# Patient Record
Sex: FEMALE | Race: OTHER | Employment: UNEMPLOYED | URBAN - METROPOLITAN AREA
[De-identification: names, ages, dates, MRNs, and addresses within clinical notes are randomized per-mention and may not be internally consistent; named-entity substitution may affect disease eponyms.]

---

## 2017-05-19 ENCOUNTER — OFFICE VISIT (OUTPATIENT)
Dept: FAMILY MEDICINE CLINIC | Facility: CLINIC | Age: 22
End: 2017-05-19

## 2017-05-19 ENCOUNTER — PATIENT OUTREACH (OUTPATIENT)
Dept: FAMILY MEDICINE CLINIC | Facility: CLINIC | Age: 22
End: 2017-05-19

## 2017-05-19 VITALS
SYSTOLIC BLOOD PRESSURE: 110 MMHG | WEIGHT: 228 LBS | TEMPERATURE: 98 F | BODY MASS INDEX: 42 KG/M2 | DIASTOLIC BLOOD PRESSURE: 70 MMHG | OXYGEN SATURATION: 98 % | HEART RATE: 94 BPM | RESPIRATION RATE: 18 BRPM

## 2017-05-19 DIAGNOSIS — R51.9 HEADACHE, UNSPECIFIED HEADACHE TYPE: ICD-10-CM

## 2017-05-19 DIAGNOSIS — J06.9 VIRAL URI WITH COUGH: Primary | ICD-10-CM

## 2017-05-19 PROCEDURE — 99213 OFFICE O/P EST LOW 20 MIN: CPT | Performed by: FAMILY MEDICINE

## 2017-05-19 RX ORDER — SUMATRIPTAN 100 MG/1
1 TABLET, FILM COATED ORAL DAILY PRN
Refills: 0 | COMMUNITY
Start: 2017-05-15 | End: 2018-07-06 | Stop reason: ALTCHOICE

## 2017-05-19 NOTE — PROGRESS NOTES
[facility] Family Medicine Office Note  Chief Complaint:   Patient presents with:  Nasal Congestion: Sinus pressure, productive cough.       HPI:   This is a 22year old female coming in for  HPI  URI symptoms  States she has had left ear pain x1 Negative for pain and visual disturbance. Respiratory: Negative for cough and shortness of breath. Cardiovascular: Negative for chest pain and palpitations. Gastrointestinal: Negative for nausea, vomiting and abdominal pain.    Genitourinary: Lázaro Mota Patient/Caregiver Education: Patient/Caregiver Education: There are no barriers to learning. Medical education done. Outcome: Patient verbalizes understanding.  Patient is notified to call with any questions, complications, allergies, or worsening

## 2017-05-19 NOTE — PATIENT INSTRUCTIONS
· Viruses do not improve with antibiotics - but it takes time for the infection to resolve. DayQuil or Mucinex to help with congestion. · Medications do not get rid of the infection, but help to alleviate symptoms.    · Honey is a natural remedy to decr

## 2017-06-16 ENCOUNTER — PATIENT OUTREACH (OUTPATIENT)
Dept: FAMILY MEDICINE CLINIC | Facility: CLINIC | Age: 22
End: 2017-06-16

## 2018-01-19 ENCOUNTER — OFFICE VISIT (OUTPATIENT)
Dept: FAMILY MEDICINE CLINIC | Facility: CLINIC | Age: 23
End: 2018-01-19

## 2018-01-19 VITALS
HEART RATE: 84 BPM | BODY MASS INDEX: 43.61 KG/M2 | RESPIRATION RATE: 16 BRPM | WEIGHT: 237 LBS | DIASTOLIC BLOOD PRESSURE: 76 MMHG | HEIGHT: 62 IN | TEMPERATURE: 98 F | SYSTOLIC BLOOD PRESSURE: 118 MMHG | OXYGEN SATURATION: 98 %

## 2018-01-19 DIAGNOSIS — H10.021 OTHER MUCOPURULENT CONJUNCTIVITIS OF RIGHT EYE: Primary | ICD-10-CM

## 2018-01-19 PROCEDURE — 99213 OFFICE O/P EST LOW 20 MIN: CPT | Performed by: NURSE PRACTITIONER

## 2018-01-19 RX ORDER — POLYMYXIN B SULFATE AND TRIMETHOPRIM 1; 10000 MG/ML; [USP'U]/ML
1 SOLUTION OPHTHALMIC EVERY 6 HOURS
Qty: 1 BOTTLE | Refills: 0 | Status: SHIPPED | OUTPATIENT
Start: 2018-01-19 | End: 2018-01-26

## 2018-01-19 NOTE — PROGRESS NOTES
CHIEF COMPLAINT:   Patient presents with:  Eye Problem: crusty, goopy Right eye this morning, needs note for work       HPI:   Prema Eaton is a 25year old female who presents with chief complaint of \"pink eye\". Symptoms began  1  days ago.  Symptoms /76 (BP Location: Right arm, Patient Position: Sitting, Cuff Size: large)   Pulse 84   Temp 98.2 °F (36.8 °C) (Oral)   Resp 16   Ht 62\"   Wt 237 lb   SpO2 98%   BMI 43.35 kg/m²   GENERAL: well developed, well nourished,in no apparent distress  SKIN: The membrane that covers the white part of your eye (the conjunctiva) is inflamed. Inflammation happens when your body responds to an injury, allergic reaction, infection, or illness.  Symptoms of inflammation in the eye may include redness, irritation, itc

## 2018-01-19 NOTE — PATIENT INSTRUCTIONS
Conjunctivitis, Nonspecific    The membrane that covers the white part of your eye (the conjunctiva) is inflamed. Inflammation happens when your body responds to an injury, allergic reaction, infection, or illness.  Symptoms of inflammation in the eye m

## 2018-07-06 ENCOUNTER — OFFICE VISIT (OUTPATIENT)
Dept: FAMILY MEDICINE CLINIC | Facility: CLINIC | Age: 23
End: 2018-07-06

## 2018-07-06 VITALS
OXYGEN SATURATION: 98 % | RESPIRATION RATE: 16 BRPM | SYSTOLIC BLOOD PRESSURE: 120 MMHG | BODY MASS INDEX: 40.3 KG/M2 | HEART RATE: 77 BPM | TEMPERATURE: 99 F | HEIGHT: 62 IN | WEIGHT: 219 LBS | DIASTOLIC BLOOD PRESSURE: 78 MMHG

## 2018-07-06 DIAGNOSIS — Z13.0 SCREENING FOR ENDOCRINE, NUTRITIONAL, METABOLIC AND IMMUNITY DISORDER: Primary | ICD-10-CM

## 2018-07-06 DIAGNOSIS — E55.9 VITAMIN D DEFICIENCY: ICD-10-CM

## 2018-07-06 DIAGNOSIS — Z13.29 SCREENING FOR ENDOCRINE, NUTRITIONAL, METABOLIC AND IMMUNITY DISORDER: Primary | ICD-10-CM

## 2018-07-06 DIAGNOSIS — Z13.228 SCREENING FOR ENDOCRINE, NUTRITIONAL, METABOLIC AND IMMUNITY DISORDER: Primary | ICD-10-CM

## 2018-07-06 DIAGNOSIS — F90.0 ATTENTION DEFICIT HYPERACTIVITY DISORDER (ADHD), PREDOMINANTLY INATTENTIVE TYPE: ICD-10-CM

## 2018-07-06 DIAGNOSIS — Z13.21 SCREENING FOR ENDOCRINE, NUTRITIONAL, METABOLIC AND IMMUNITY DISORDER: Primary | ICD-10-CM

## 2018-07-06 PROCEDURE — 99213 OFFICE O/P EST LOW 20 MIN: CPT | Performed by: NURSE PRACTITIONER

## 2018-07-06 NOTE — PROGRESS NOTES
[facility]   PROGRESS NOTE  Chief Complaint:   Patient presents with:  ADD: f/u      HPI:   This is a 25year old female coming in for ADHD medication follow up     ADHD :  Patient is here for for ADHD follow up . History provided by the patient. throat.   INTEGUMENTARY:  Denies rashes, or itchy skin   CARDIOVASCULAR:  Denies chest pain,  Or palpitations   RESPIRATORY:  Denies shortness of breath, wheezing  GASTROINTESTINAL:  Denies abdominal pain, nausea, vomiting, constipation  MUSCULOSKELETAL: + Lisdexamfetamine Dimesylate (VYVANSE) 20 MG Oral Cap; Take 1 capsule (20 mg total) by mouth daily.  -     Lisdexamfetamine Dimesylate (VYVANSE) 20 MG Oral Cap; Take 1 capsule (20 mg total) by mouth daily.     Continue  Treatment as prescribed     Other orde symptoms. Patient is to call with any side effects or complications from the treatments as a result of today.      Problem List:  Patient Active Problem List:     ADD (attention deficit disorder)     Mild intermittent asthma without complication      Carlotta Acuna

## 2018-07-23 ENCOUNTER — OFFICE VISIT (OUTPATIENT)
Dept: FAMILY MEDICINE CLINIC | Facility: CLINIC | Age: 23
End: 2018-07-23
Payer: COMMERCIAL

## 2018-07-23 VITALS
WEIGHT: 223 LBS | RESPIRATION RATE: 16 BRPM | DIASTOLIC BLOOD PRESSURE: 74 MMHG | OXYGEN SATURATION: 98 % | HEIGHT: 63 IN | HEART RATE: 62 BPM | SYSTOLIC BLOOD PRESSURE: 118 MMHG | TEMPERATURE: 99 F | BODY MASS INDEX: 39.51 KG/M2

## 2018-07-23 DIAGNOSIS — J06.9 VIRAL UPPER RESPIRATORY INFECTION: ICD-10-CM

## 2018-07-23 DIAGNOSIS — J02.9 SORE THROAT: Primary | ICD-10-CM

## 2018-07-23 LAB — CONTROL LINE PRESENT WITH A CLEAR BACKGROUND (YES/NO): YES YES/NO

## 2018-07-23 PROCEDURE — 99213 OFFICE O/P EST LOW 20 MIN: CPT | Performed by: NURSE PRACTITIONER

## 2018-07-23 PROCEDURE — 87880 STREP A ASSAY W/OPTIC: CPT | Performed by: NURSE PRACTITIONER

## 2018-07-23 NOTE — PROGRESS NOTES
CHIEF COMPLAINT:   Patient presents with:  Sore Throat: 4 days. HPI:   Paxton Plummer is a 25year old female who presents to clinic with symptoms of sore throat. Patient has had for 4 days. Symptoms have been about the same since onset.   Patient repo EARS: TM's clear, non-injected, no bulging, retraction, or fluid  NOSE: nostrils patent, no exudates, nasal mucosa pink and noninflamed  THROAT: oral mucosa pink, moist. Posterior pharynx erythematous, not injected. no exudates. Tonsils 2/4.   Breath not ma · Tylenol/Ibuprofen per package directions as needed to relieve headache and fever  · Avoid antihistamines as they may impair flow of mucous  · Follow up is not indicated unless symptoms worsen after 3-5 days, new symptoms develop, or symptoms do not impro · Avoid being exposed to cigarette smoke (yours or others’).   · You may use acetaminophen or ibuprofen to control pain and fever, unless another medicine was prescribed. If you have chronic liver or kidney disease, have ever had a stomach ulcer or gastroin Sore throats happen for many reasons, such as colds, allergies, and infections caused by viruses or bacteria. In any case, your throat becomes red and sore.  Your goal for self-care is to reduce your discomfort while giving your throat a chance to heal.  Mo · A temperature over 101°F (38.3°C)  · White spots on the throat  · Great difficulty swallowing  · Trouble breathing  · A skin rash  · Recent exposure to someone else with strep bacteria  · Severe hoarseness and swollen glands in the neck or jaw   Date Las

## 2018-07-23 NOTE — PATIENT INSTRUCTIONS
Viral Upper Respiratory Illness (Adult)  You have a viral upper respiratory illness (URI), which is another term for the common cold. This illness is contagious during the first few days. It is spread through the air by coughing and sneezing.  It may also Call your healthcare provider right away if any of these occur:  · Cough with lots of colored sputum (mucus)  · Severe headache; face, neck, or ear pain  · Difficulty swallowing due to throat pain  · Fever of 100.4°F (38°C) or higher, or as directed by you · Ease pain with anesthetic sprays. Aspirin or an aspirin substitute also helps.  Remember, never give aspirin to anyone 25 or younger, or if you are already taking blood thinners.   · For sore throats caused by allergies, try antihistamines to block the al · Warm fluids such as tea and chicken soup can increase the rate of mucous flow  · Salt water gargle for sore throat  · Hard candy or lozenge for sore throat and cough  · Tylenol/Ibuprofen per package directions as needed to relieve headache and fever  · A

## 2018-08-10 ENCOUNTER — LAB ENCOUNTER (OUTPATIENT)
Dept: LAB | Age: 23
End: 2018-08-10
Attending: FAMILY MEDICINE
Payer: COMMERCIAL

## 2018-08-10 ENCOUNTER — OFFICE VISIT (OUTPATIENT)
Dept: FAMILY MEDICINE CLINIC | Facility: CLINIC | Age: 23
End: 2018-08-10
Payer: COMMERCIAL

## 2018-08-10 VITALS
HEIGHT: 62 IN | SYSTOLIC BLOOD PRESSURE: 110 MMHG | RESPIRATION RATE: 16 BRPM | OXYGEN SATURATION: 98 % | WEIGHT: 217 LBS | HEART RATE: 80 BPM | DIASTOLIC BLOOD PRESSURE: 70 MMHG | BODY MASS INDEX: 39.93 KG/M2

## 2018-08-10 DIAGNOSIS — Z12.4 CERVICAL CANCER SCREENING: ICD-10-CM

## 2018-08-10 DIAGNOSIS — Z13.228 SCREENING FOR ENDOCRINE, NUTRITIONAL, METABOLIC AND IMMUNITY DISORDER: ICD-10-CM

## 2018-08-10 DIAGNOSIS — Z13.0 SCREENING FOR ENDOCRINE, NUTRITIONAL, METABOLIC AND IMMUNITY DISORDER: ICD-10-CM

## 2018-08-10 DIAGNOSIS — F98.8 ATTENTION DEFICIT DISORDER, UNSPECIFIED HYPERACTIVITY PRESENCE: ICD-10-CM

## 2018-08-10 DIAGNOSIS — Z00.00 HEALTH MAINTENANCE EXAMINATION: Primary | ICD-10-CM

## 2018-08-10 DIAGNOSIS — Z13.21 SCREENING FOR ENDOCRINE, NUTRITIONAL, METABOLIC AND IMMUNITY DISORDER: ICD-10-CM

## 2018-08-10 DIAGNOSIS — E55.9 VITAMIN D DEFICIENCY: ICD-10-CM

## 2018-08-10 DIAGNOSIS — Z13.29 SCREENING FOR ENDOCRINE, NUTRITIONAL, METABOLIC AND IMMUNITY DISORDER: ICD-10-CM

## 2018-08-10 LAB
ALBUMIN SERPL-MCNC: 4.3 G/DL (ref 3.5–4.8)
ALBUMIN/GLOB SERPL: 1.2 {RATIO} (ref 1–2)
ALP LIVER SERPL-CCNC: 64 U/L (ref 52–144)
ALT SERPL-CCNC: 20 U/L (ref 14–54)
ANION GAP SERPL CALC-SCNC: 8 MMOL/L (ref 0–18)
AST SERPL-CCNC: 18 U/L (ref 15–41)
BASOPHILS # BLD AUTO: 0.04 X10(3) UL (ref 0–0.1)
BASOPHILS NFR BLD AUTO: 0.6 %
BILIRUB SERPL-MCNC: 0.4 MG/DL (ref 0.1–2)
BUN BLD-MCNC: 10 MG/DL (ref 8–20)
BUN/CREAT SERPL: 11.6 (ref 10–20)
CALCIUM BLD-MCNC: 9.3 MG/DL (ref 8.3–10.3)
CHLORIDE SERPL-SCNC: 106 MMOL/L (ref 101–111)
CHOLEST SMN-MCNC: 165 MG/DL (ref ?–200)
CO2 SERPL-SCNC: 26 MMOL/L (ref 22–32)
CREAT BLD-MCNC: 0.86 MG/DL (ref 0.55–1.02)
EOSINOPHIL # BLD AUTO: 0.16 X10(3) UL (ref 0–0.3)
EOSINOPHIL NFR BLD AUTO: 2.4 %
ERYTHROCYTE [DISTWIDTH] IN BLOOD BY AUTOMATED COUNT: 12 % (ref 11.5–16)
GLOBULIN PLAS-MCNC: 3.7 G/DL (ref 2.5–3.7)
GLUCOSE BLD-MCNC: 81 MG/DL (ref 70–99)
HCT VFR BLD AUTO: 45.7 % (ref 34–50)
HDLC SERPL-MCNC: 40 MG/DL (ref 40–59)
HGB BLD-MCNC: 14.7 G/DL (ref 12–16)
IMMATURE GRANULOCYTE COUNT: 0.02 X10(3) UL (ref 0–1)
IMMATURE GRANULOCYTE RATIO %: 0.3 %
LDLC SERPL CALC-MCNC: 107 MG/DL (ref ?–100)
LYMPHOCYTES # BLD AUTO: 2.07 X10(3) UL (ref 0.9–4)
LYMPHOCYTES NFR BLD AUTO: 31.6 %
M PROTEIN MFR SERPL ELPH: 8 G/DL (ref 6.1–8.3)
MCH RBC QN AUTO: 27.8 PG (ref 27–33.2)
MCHC RBC AUTO-ENTMCNC: 32.2 G/DL (ref 31–37)
MCV RBC AUTO: 86.6 FL (ref 81–100)
MONOCYTES # BLD AUTO: 0.41 X10(3) UL (ref 0.1–1)
MONOCYTES NFR BLD AUTO: 6.3 %
NEUTROPHIL ABS PRELIM: 3.86 X10 (3) UL (ref 1.3–6.7)
NEUTROPHILS # BLD AUTO: 3.86 X10(3) UL (ref 1.3–6.7)
NEUTROPHILS NFR BLD AUTO: 58.8 %
NONHDLC SERPL-MCNC: 125 MG/DL (ref ?–130)
OSMOLALITY SERPL CALC.SUM OF ELEC: 288 MOSM/KG (ref 275–295)
PLATELET # BLD AUTO: 321 10(3)UL (ref 150–450)
POTASSIUM SERPL-SCNC: 3.9 MMOL/L (ref 3.6–5.1)
RBC # BLD AUTO: 5.28 X10(6)UL (ref 3.8–5.1)
RED CELL DISTRIBUTION WIDTH-SD: 38.3 FL (ref 35.1–46.3)
SODIUM SERPL-SCNC: 140 MMOL/L (ref 136–144)
T4 FREE SERPL-MCNC: 1 NG/DL (ref 0.9–1.8)
TRIGL SERPL-MCNC: 91 MG/DL (ref 30–149)
TSI SER-ACNC: 2.69 MIU/ML (ref 0.35–5.5)
VIT D+METAB SERPL-MCNC: 18.7 NG/ML (ref 30–100)
VLDLC SERPL CALC-MCNC: 18 MG/DL (ref 0–30)
WBC # BLD AUTO: 6.6 X10(3) UL (ref 4–13)

## 2018-08-10 PROCEDURE — 87491 CHLMYD TRACH DNA AMP PROBE: CPT | Performed by: FAMILY MEDICINE

## 2018-08-10 PROCEDURE — 80050 GENERAL HEALTH PANEL: CPT | Performed by: NURSE PRACTITIONER

## 2018-08-10 PROCEDURE — 36415 COLL VENOUS BLD VENIPUNCTURE: CPT | Performed by: NURSE PRACTITIONER

## 2018-08-10 PROCEDURE — 82306 VITAMIN D 25 HYDROXY: CPT | Performed by: NURSE PRACTITIONER

## 2018-08-10 PROCEDURE — 88175 CYTOPATH C/V AUTO FLUID REDO: CPT | Performed by: FAMILY MEDICINE

## 2018-08-10 PROCEDURE — 87591 N.GONORRHOEAE DNA AMP PROB: CPT | Performed by: FAMILY MEDICINE

## 2018-08-10 PROCEDURE — 80061 LIPID PANEL: CPT | Performed by: NURSE PRACTITIONER

## 2018-08-10 PROCEDURE — 84439 ASSAY OF FREE THYROXINE: CPT | Performed by: NURSE PRACTITIONER

## 2018-08-10 PROCEDURE — 99395 PREV VISIT EST AGE 18-39: CPT | Performed by: FAMILY MEDICINE

## 2018-08-10 NOTE — PROGRESS NOTES
Lo Bynum is a 25year old female.     CC:  Patient presents with:  Physical      HPI:    Annual Physical due on 09/12/1998  Pap Smear,3 Years due on 09/12/2017  Asthma Action Plan due on 05/19/2018  Asthma Control Test due on 05/19/2018  Influenza Used                      Alcohol use:  Yes                      Immunization History  Administered            Date(s) Administered    >=3 YRS FLUZONE OR FLUARIX QUAD PRESERVE FREE SINGLE DOSE (75268) FLU CLINIC                          11/04/2016 auscultation  CARDIO: RRR without murmur  GI: good BS's,no masses, HSM or tenderness  :  normal external genitalia; no vaginal discharge; cervix is nonfriable, bimanual normal; no adnexal masses or tenderness  MUSCULOSKELETAL: back is not tender,FROM of

## 2018-08-12 LAB
C TRACH DNA SPEC QL NAA+PROBE: NEGATIVE
N GONORRHOEA DNA SPEC QL NAA+PROBE: NEGATIVE

## 2018-08-13 ENCOUNTER — TELEPHONE (OUTPATIENT)
Dept: FAMILY MEDICINE CLINIC | Facility: CLINIC | Age: 23
End: 2018-08-13

## 2018-08-13 NOTE — TELEPHONE ENCOUNTER
----- Message from SELMA Rincon sent at 8/10/2018  2:45 PM CDT -----  Vitamin D is very low -sent new prescription for Vitamin D -take it once a week

## 2018-08-16 ENCOUNTER — TELEPHONE (OUTPATIENT)
Dept: FAMILY MEDICINE CLINIC | Facility: CLINIC | Age: 23
End: 2018-08-16

## 2018-08-16 NOTE — TELEPHONE ENCOUNTER
----- Message from Rona Martinez MD sent at 8/16/2018  8:58 AM CDT -----  Normal pap, repeat in 2 years

## 2018-08-16 NOTE — TELEPHONE ENCOUNTER
LVM stating results of most recent testing was normal/negative. Informed pt that she would repeat her tests in 2 years and to call back should she have any further questions/concerns.

## 2018-09-17 NOTE — TELEPHONE ENCOUNTER
Medication(s) to Refill:   Requested Prescriptions     Pending Prescriptions Disp Refills   • Lisdexamfetamine Dimesylate (VYVANSE) 30 MG Oral Cap 30 capsule 1     Sig: Take 1 capsule (30 mg total) by mouth every morning.          Reason for Medication Refi

## 2018-10-01 ENCOUNTER — OFFICE VISIT (OUTPATIENT)
Dept: FAMILY MEDICINE CLINIC | Facility: CLINIC | Age: 23
End: 2018-10-01
Payer: COMMERCIAL

## 2018-10-01 VITALS
SYSTOLIC BLOOD PRESSURE: 110 MMHG | HEART RATE: 77 BPM | RESPIRATION RATE: 16 BRPM | OXYGEN SATURATION: 98 % | TEMPERATURE: 98 F | WEIGHT: 221 LBS | BODY MASS INDEX: 40.67 KG/M2 | DIASTOLIC BLOOD PRESSURE: 70 MMHG | HEIGHT: 62 IN

## 2018-10-01 DIAGNOSIS — G89.29 CHRONIC BILATERAL THORACIC BACK PAIN: Primary | ICD-10-CM

## 2018-10-01 DIAGNOSIS — M54.2 NECK PAIN: ICD-10-CM

## 2018-10-01 DIAGNOSIS — M54.6 CHRONIC BILATERAL THORACIC BACK PAIN: Primary | ICD-10-CM

## 2018-10-01 DIAGNOSIS — M54.50 LUMBAR BACK PAIN: ICD-10-CM

## 2018-10-01 PROCEDURE — 99214 OFFICE O/P EST MOD 30 MIN: CPT | Performed by: FAMILY MEDICINE

## 2018-10-01 NOTE — PROGRESS NOTES
[facility] Family Medicine Office Note  Chief Complaint:   Patient presents with:  Back Pain: f/u, worsening  Knee Pain: fell 2 weeks ago, left knee  Neck Pain: x4 days, back of neck, sharp pain, dizziness      HPI:   This is a 26year old female mouth every morning. Disp: 30 capsule Rfl: 0   ergocalciferol 16208 units Oral Cap Take 1 capsule (50,000 Units total) by mouth once a week.  Disp: 4 capsule Rfl: 3   Albuterol Sulfate  (90 BASE) MCG/ACT Inhalation Aero Soln Inhale 2 puffs into the l pain  - OP REFERRAL TO [facility]    3. Neck pain    Posture and weight likely contributing to chronic msk pain  May need to be evaluated for macromastia  - start with PT, NSAIDs and weight loss  - If not improving - will get imaging a

## 2018-10-01 NOTE — PATIENT INSTRUCTIONS
Tips for Weight Loss    1. Portion size - look at the size of your plate - an 8-9 inch plate should be sufficient. If you are using a 10 inch plate, do not fill completely.    2. Food groups - 1/2 of your plate should consist of non-starchy vegetables (catrina

## 2018-10-04 PROBLEM — G89.29 CHRONIC BILATERAL THORACIC BACK PAIN: Status: ACTIVE | Noted: 2018-10-04

## 2018-10-04 PROBLEM — M54.50 LUMBAR BACK PAIN: Status: ACTIVE | Noted: 2018-10-04

## 2018-10-04 PROBLEM — M54.6 CHRONIC BILATERAL THORACIC BACK PAIN: Status: ACTIVE | Noted: 2018-10-04

## 2018-10-25 ENCOUNTER — APPOINTMENT (OUTPATIENT)
Dept: PHYSICAL THERAPY | Age: 23
End: 2018-10-25
Attending: FAMILY MEDICINE
Payer: COMMERCIAL

## 2018-10-27 ENCOUNTER — OFFICE VISIT (OUTPATIENT)
Dept: FAMILY MEDICINE CLINIC | Facility: CLINIC | Age: 23
End: 2018-10-27
Payer: COMMERCIAL

## 2018-10-27 VITALS
DIASTOLIC BLOOD PRESSURE: 76 MMHG | TEMPERATURE: 98 F | SYSTOLIC BLOOD PRESSURE: 114 MMHG | HEIGHT: 62 IN | OXYGEN SATURATION: 97 % | HEART RATE: 95 BPM | BODY MASS INDEX: 39.75 KG/M2 | WEIGHT: 216 LBS

## 2018-10-27 DIAGNOSIS — J20.9 ACUTE BRONCHITIS, UNSPECIFIED ORGANISM: ICD-10-CM

## 2018-10-27 DIAGNOSIS — J01.90 ACUTE SINUSITIS, RECURRENCE NOT SPECIFIED, UNSPECIFIED LOCATION: Primary | ICD-10-CM

## 2018-10-27 PROCEDURE — 99213 OFFICE O/P EST LOW 20 MIN: CPT | Performed by: FAMILY MEDICINE

## 2018-10-27 RX ORDER — BENZONATATE 200 MG/1
200 CAPSULE ORAL 3 TIMES DAILY PRN
Qty: 30 CAPSULE | Refills: 0 | Status: SHIPPED | OUTPATIENT
Start: 2018-10-27 | End: 2018-12-10

## 2018-10-27 RX ORDER — AMOXICILLIN AND CLAVULANATE POTASSIUM 875; 125 MG/1; MG/1
1 TABLET, FILM COATED ORAL 2 TIMES DAILY
Qty: 20 TABLET | Refills: 0 | Status: SHIPPED | OUTPATIENT
Start: 2018-10-27 | End: 2018-11-06

## 2018-10-27 NOTE — PROGRESS NOTES
CHIEF COMPLAINT:   Patient presents with:  Cough: productive cough (green) x 1 week. Head aches (frontal),body aches, loss of appetite, sore throat. Patient denies any NV fever.       HPI:   Winsome Tripp is a 26year old female who presents for upper re normocephalic.   + pressure on palpation of maxillary or frontal sinuses  EYES: conjunctiva clear, EOM intact  EARS: TM's dull, no bulging, no retraction,+ fluid  NOSE: Nostrils patent, mucoid nasal discharge, nasal mucosa redden and swollen  THROAT: Oral m

## 2018-10-27 NOTE — PATIENT INSTRUCTIONS
Acute Bronchitis  Your healthcare provider has told you that you have acute bronchitis. Bronchitis is infection or inflammation of the bronchial tubes (airways in the lungs). Normally, air moves easily in and out of the airways.  Bronchitis narrows the ai · Drink plenty of fluids, such as water, juice, or warm soup. Fluids loosen mucus so that you can cough it up. This helps you breathe more easily. Fluids also prevent dehydration. · Make sure you get plenty of rest.  · Do not smoke.  Do not allow anyone el Drinking extra fluids helps thin your mucus. This lets it drain from your sinuses more easily. Have a glass of water every hour or two. A humidifier helps in much the same way. Fluids can also offset the drying effects of certain medicines.  If you use a hu

## 2018-10-29 ENCOUNTER — APPOINTMENT (OUTPATIENT)
Dept: PHYSICAL THERAPY | Age: 23
End: 2018-10-29
Attending: FAMILY MEDICINE
Payer: COMMERCIAL

## 2018-11-01 ENCOUNTER — APPOINTMENT (OUTPATIENT)
Dept: PHYSICAL THERAPY | Age: 23
End: 2018-11-01
Attending: FAMILY MEDICINE
Payer: COMMERCIAL

## 2018-11-06 ENCOUNTER — APPOINTMENT (OUTPATIENT)
Dept: PHYSICAL THERAPY | Age: 23
End: 2018-11-06
Attending: FAMILY MEDICINE
Payer: COMMERCIAL

## 2018-11-08 ENCOUNTER — APPOINTMENT (OUTPATIENT)
Dept: PHYSICAL THERAPY | Age: 23
End: 2018-11-08
Attending: FAMILY MEDICINE
Payer: COMMERCIAL

## 2018-11-13 ENCOUNTER — APPOINTMENT (OUTPATIENT)
Dept: PHYSICAL THERAPY | Age: 23
End: 2018-11-13
Attending: FAMILY MEDICINE
Payer: COMMERCIAL

## 2018-11-15 ENCOUNTER — APPOINTMENT (OUTPATIENT)
Dept: PHYSICAL THERAPY | Age: 23
End: 2018-11-15
Attending: FAMILY MEDICINE
Payer: COMMERCIAL

## 2018-11-20 ENCOUNTER — APPOINTMENT (OUTPATIENT)
Dept: PHYSICAL THERAPY | Age: 23
End: 2018-11-20
Attending: FAMILY MEDICINE
Payer: COMMERCIAL

## 2018-12-10 ENCOUNTER — OFFICE VISIT (OUTPATIENT)
Dept: FAMILY MEDICINE CLINIC | Facility: CLINIC | Age: 23
End: 2018-12-10
Payer: COMMERCIAL

## 2018-12-10 VITALS
OXYGEN SATURATION: 98 % | BODY MASS INDEX: 39.75 KG/M2 | SYSTOLIC BLOOD PRESSURE: 110 MMHG | HEIGHT: 62 IN | RESPIRATION RATE: 16 BRPM | DIASTOLIC BLOOD PRESSURE: 70 MMHG | HEART RATE: 64 BPM | WEIGHT: 216 LBS | TEMPERATURE: 98 F

## 2018-12-10 DIAGNOSIS — R11.2 NON-INTRACTABLE VOMITING WITH NAUSEA, UNSPECIFIED VOMITING TYPE: Primary | ICD-10-CM

## 2018-12-10 DIAGNOSIS — F98.8 ATTENTION DEFICIT DISORDER, UNSPECIFIED HYPERACTIVITY PRESENCE: ICD-10-CM

## 2018-12-10 PROCEDURE — 99213 OFFICE O/P EST LOW 20 MIN: CPT | Performed by: FAMILY MEDICINE

## 2018-12-10 RX ORDER — ONDANSETRON 4 MG/1
4 TABLET, FILM COATED ORAL EVERY 8 HOURS PRN
Qty: 20 TABLET | Refills: 0 | Status: SHIPPED | OUTPATIENT
Start: 2018-12-10 | End: 2019-03-18 | Stop reason: ALTCHOICE

## 2018-12-10 NOTE — PROGRESS NOTES
Julio Melendez is a 26year old female who presents for vomiting    HPI:   Pt complains of  Vomiting for , associated with abdominal cramps. Yellow color. Nausea, worse with any food, not at night. Moderate, worsening.   Eating and drinking fluids makes ev Readings from Last 6 Encounters:  12/10/18 : 216 lb  10/27/18 : 216 lb  10/01/18 : 221 lb  08/10/18 : 217 lb  07/23/18 : 223 lb  07/06/18 : 219 lb    Body mass index is 39.51 kg/m².      Cholesterol, Total (mg/dL)   Date Value   08/10/2018 165     HDL Raisa nausea, unspecified vomiting type  - Ondansetron HCl (ZOFRAN) 4 mg tablet; Take 1 tablet (4 mg total) by mouth every 8 (eight) hours as needed for Nausea. Dispense: 20 tablet; Refill: 0  -Recommending adequate upkeep of hydration    2.  Attention deficit d

## 2018-12-10 NOTE — PATIENT INSTRUCTIONS
Vomiting (Adult)  Vomiting is a common symptom that may be due to different causes. These include gastroenteritis (\"stomach flu\"), food poisoning and gastritis.  There are other more serious causes of vomiting which may be hard to diagnose early in the · If medicines for vomiting were prescribed, take as directed. · Once vomiting stops, then follow these guidelines:  During the first 12 to 24 hours follow the diet below:  · Fruit juices.  Apple, grape juice, clear fruit drinks, and electrolyte replacemen

## 2019-02-12 DIAGNOSIS — J45.30 MILD PERSISTENT ASTHMA WITHOUT COMPLICATION: ICD-10-CM

## 2019-02-12 NOTE — TELEPHONE ENCOUNTER
LOV acute 12/10/18  LOV with Dr Tiffanie Loving 10/1/18  LF Vyvanse 12/10/18   LF Albuterol 11/14/16 # 30 days w 2 rf.

## 2019-02-12 NOTE — TELEPHONE ENCOUNTER
Patient needs the following 2 medications refilled:  Albuterol Sulfate  (90 BASE) MCG/ACT Inhalation Aero Soln   Lisdexamfetamine Dimesylate (VYVANSE) 30 MG Oral Cap 30 capsule 0 12/10/2018 1/9/2019   Sig :  Take 1 capsule (30 mg total) by mouth radha

## 2019-02-13 ENCOUNTER — TELEPHONE (OUTPATIENT)
Dept: FAMILY MEDICINE CLINIC | Facility: CLINIC | Age: 24
End: 2019-02-13

## 2019-02-13 RX ORDER — ALBUTEROL SULFATE 90 UG/1
2 AEROSOL, METERED RESPIRATORY (INHALATION) EVERY 6 HOURS PRN
Qty: 1 INHALER | Refills: 1 | Status: SHIPPED | OUTPATIENT
Start: 2019-02-13 | End: 2019-05-07

## 2019-02-13 NOTE — TELEPHONE ENCOUNTER
Attempted to contact the patient to let her know that her prescription is ready for , no answer, no voice mail.

## 2019-02-13 NOTE — TELEPHONE ENCOUNTER
Attempted to contact patient to inform her that medication was approved, Her phone just kept ringing and no voicemail ever picked up to leave a message.

## 2019-03-04 ENCOUNTER — TELEPHONE (OUTPATIENT)
Dept: FAMILY MEDICINE CLINIC | Facility: CLINIC | Age: 24
End: 2019-03-04

## 2019-03-04 RX ORDER — OSELTAMIVIR PHOSPHATE 75 MG/1
75 CAPSULE ORAL 2 TIMES DAILY
Qty: 10 CAPSULE | Refills: 0 | Status: SHIPPED | OUTPATIENT
Start: 2019-03-04 | End: 2019-03-04

## 2019-03-04 RX ORDER — DEXTROAMPHETAMINE SACCHARATE, AMPHETAMINE ASPARTATE, DEXTROAMPHETAMINE SULFATE AND AMPHETAMINE SULFATE 7.5; 7.5; 7.5; 7.5 MG/1; MG/1; MG/1; MG/1
30 TABLET ORAL DAILY
Qty: 30 TABLET | Refills: 0 | Status: CANCELLED | OUTPATIENT
Start: 2019-04-03 | End: 2019-05-03

## 2019-03-04 RX ORDER — DEXTROAMPHETAMINE SACCHARATE, AMPHETAMINE ASPARTATE, DEXTROAMPHETAMINE SULFATE AND AMPHETAMINE SULFATE 7.5; 7.5; 7.5; 7.5 MG/1; MG/1; MG/1; MG/1
30 TABLET ORAL DAILY
Qty: 30 TABLET | Refills: 0 | Status: CANCELLED | OUTPATIENT
Start: 2019-05-03 | End: 2019-06-02

## 2019-03-04 RX ORDER — DEXTROAMPHETAMINE SACCHARATE, AMPHETAMINE ASPARTATE, DEXTROAMPHETAMINE SULFATE AND AMPHETAMINE SULFATE 7.5; 7.5; 7.5; 7.5 MG/1; MG/1; MG/1; MG/1
30 TABLET ORAL DAILY
Qty: 30 TABLET | Refills: 0 | Status: CANCELLED | OUTPATIENT
Start: 2019-03-04 | End: 2019-04-03

## 2019-03-04 RX ORDER — OSELTAMIVIR PHOSPHATE 75 MG/1
75 CAPSULE ORAL 2 TIMES DAILY
Qty: 10 CAPSULE | Refills: 0 | Status: SHIPPED | OUTPATIENT
Start: 2019-03-04 | End: 2019-03-09

## 2019-03-04 NOTE — TELEPHONE ENCOUNTER
Pt c/o a stomach ache, congestion, headache since yesterday and now feels warm. Has not checked her temperature. I loaded the treatment dose.

## 2019-03-04 NOTE — TELEPHONE ENCOUNTER
Patient's younger sister Verner Stabler tested positive for Influenza A and they recommend that patient is put on Tamoflu. Please advise.

## 2019-03-05 NOTE — TELEPHONE ENCOUNTER
-please tell pt 1 RX is ready and to schedule a f/up in 3-4 weeks so that she can get a 3 months worth of RXat her next appointment.

## 2019-03-06 NOTE — TELEPHONE ENCOUNTER
Called patient no answer unable to get in touch with patient to inform her medication is ready for  and to bring photo ID.

## 2019-03-06 NOTE — TELEPHONE ENCOUNTER
Patients mom Renetta came in today she is on the hippa, she picked up script and photo ID was checked.  Mom also updated patients phone number and patient will need to do a new verbal consent

## 2019-03-18 ENCOUNTER — LAB ENCOUNTER (OUTPATIENT)
Dept: LAB | Age: 24
End: 2019-03-18
Attending: FAMILY MEDICINE
Payer: COMMERCIAL

## 2019-03-18 DIAGNOSIS — R10.13 EPIGASTRIC ABDOMINAL PAIN: ICD-10-CM

## 2019-03-18 DIAGNOSIS — E66.01 CLASS 3 SEVERE OBESITY DUE TO EXCESS CALORIES WITHOUT SERIOUS COMORBIDITY WITH BODY MASS INDEX (BMI) OF 40.0 TO 44.9 IN ADULT (HCC): ICD-10-CM

## 2019-03-18 LAB
ALBUMIN SERPL-MCNC: 3.8 G/DL (ref 3.4–5)
ALBUMIN/GLOB SERPL: 1.1 {RATIO} (ref 1–2)
ALP LIVER SERPL-CCNC: 58 U/L (ref 52–144)
ALT SERPL-CCNC: 19 U/L (ref 13–56)
ANION GAP SERPL CALC-SCNC: 7 MMOL/L (ref 0–18)
AST SERPL-CCNC: 15 U/L (ref 15–37)
BASOPHILS # BLD AUTO: 0.05 X10(3) UL (ref 0–0.2)
BASOPHILS NFR BLD AUTO: 0.7 %
BILIRUB SERPL-MCNC: 0.2 MG/DL (ref 0.1–2)
BUN BLD-MCNC: 8 MG/DL (ref 7–18)
BUN/CREAT SERPL: 10 (ref 10–20)
CALCIUM BLD-MCNC: 9 MG/DL (ref 8.5–10.1)
CHLORIDE SERPL-SCNC: 109 MMOL/L (ref 98–107)
CHOLEST SMN-MCNC: 167 MG/DL (ref ?–200)
CO2 SERPL-SCNC: 26 MMOL/L (ref 21–32)
CREAT BLD-MCNC: 0.8 MG/DL (ref 0.55–1.02)
DEPRECATED RDW RBC AUTO: 40.9 FL (ref 35.1–46.3)
EOSINOPHIL # BLD AUTO: 0.67 X10(3) UL (ref 0–0.7)
EOSINOPHIL NFR BLD AUTO: 8.9 %
ERYTHROCYTE [DISTWIDTH] IN BLOOD BY AUTOMATED COUNT: 12.5 % (ref 11–15)
GLOBULIN PLAS-MCNC: 3.5 G/DL (ref 2.8–4.4)
GLUCOSE BLD-MCNC: 81 MG/DL (ref 70–99)
HCT VFR BLD AUTO: 43.9 % (ref 35–48)
HDLC SERPL-MCNC: 42 MG/DL (ref 40–59)
HGB BLD-MCNC: 14 G/DL (ref 12–16)
IMM GRANULOCYTES # BLD AUTO: 0.04 X10(3) UL (ref 0–1)
IMM GRANULOCYTES NFR BLD: 0.5 %
LDLC SERPL CALC-MCNC: 107 MG/DL (ref ?–100)
LIPASE SERPL-CCNC: 86 U/L (ref 73–393)
LYMPHOCYTES # BLD AUTO: 2.3 X10(3) UL (ref 1–4)
LYMPHOCYTES NFR BLD AUTO: 30.4 %
M PROTEIN MFR SERPL ELPH: 7.3 G/DL (ref 6.4–8.2)
MCH RBC QN AUTO: 28.3 PG (ref 26–34)
MCHC RBC AUTO-ENTMCNC: 31.9 G/DL (ref 31–37)
MCV RBC AUTO: 88.9 FL (ref 80–100)
MONOCYTES # BLD AUTO: 0.4 X10(3) UL (ref 0.1–1)
MONOCYTES NFR BLD AUTO: 5.3 %
NEUTROPHILS # BLD AUTO: 4.11 X10 (3) UL (ref 1.5–7.7)
NEUTROPHILS # BLD AUTO: 4.11 X10(3) UL (ref 1.5–7.7)
NEUTROPHILS NFR BLD AUTO: 54.2 %
NONHDLC SERPL-MCNC: 125 MG/DL (ref ?–130)
OSMOLALITY SERPL CALC.SUM OF ELEC: 291 MOSM/KG (ref 275–295)
PLATELET # BLD AUTO: 301 10(3)UL (ref 150–450)
POTASSIUM SERPL-SCNC: 4.2 MMOL/L (ref 3.5–5.1)
RBC # BLD AUTO: 4.94 X10(6)UL (ref 3.8–5.3)
SODIUM SERPL-SCNC: 142 MMOL/L (ref 136–145)
TRIGL SERPL-MCNC: 92 MG/DL (ref 30–149)
TSI SER-ACNC: 2.4 MIU/ML (ref 0.36–3.74)
VLDLC SERPL CALC-MCNC: 18 MG/DL (ref 0–30)
WBC # BLD AUTO: 7.6 X10(3) UL (ref 4–11)

## 2019-03-18 PROCEDURE — 80061 LIPID PANEL: CPT | Performed by: FAMILY MEDICINE

## 2019-03-18 PROCEDURE — 83690 ASSAY OF LIPASE: CPT | Performed by: FAMILY MEDICINE

## 2019-03-18 PROCEDURE — 80050 GENERAL HEALTH PANEL: CPT | Performed by: FAMILY MEDICINE

## 2019-03-18 PROCEDURE — 36415 COLL VENOUS BLD VENIPUNCTURE: CPT | Performed by: FAMILY MEDICINE

## 2019-03-18 NOTE — PATIENT INSTRUCTIONS
Start ranitidine 150mg twice a day to treat heartburn and stomach pain  Work with nutritionist - if you are still having trouble losing weight - we will discuss starting medication or referring you to the weight loss clinic

## 2019-03-18 NOTE — PROGRESS NOTES
[facility] Family Medicine Office Note  Chief Complaint:   Patient presents with:  ADD  Abdominal Pain: x6 months, worst in the morning and after eating, no vomiting/on and off diarrhea       HPI:   This is a 26year old female coming in for  HPI 1 capsule (40 mg total) by mouth daily. Disp: 30 capsule Rfl: 0   raNITIdine HCl 150 MG Oral Tab Take 1 tablet (150 mg total) by mouth 2 (two) times daily.  Disp: 60 tablet Rfl: 2   Lisdexamfetamine Dimesylate (VYVANSE) 30 MG Oral Cap Take 1 capsule (30 mg breath sounds normal. No respiratory distress. She has no wheezes. She has no rales. Abdominal: Soft. Bowel sounds are normal. There is no hepatosplenomegaly. There is tenderness (mild) in the epigastric area.  There is no rebound, no guarding, no CVA ten Education: Patient/Caregiver Education: There are no barriers to learning. Medical education done. Outcome: Patient verbalizes understanding. Patient is notified to call with any questions, complications, allergies, or worsening or changing symptoms.   Pa

## 2019-03-19 ENCOUNTER — TELEPHONE (OUTPATIENT)
Dept: FAMILY MEDICINE CLINIC | Facility: CLINIC | Age: 24
End: 2019-03-19

## 2019-03-19 NOTE — TELEPHONE ENCOUNTER
----- Message from Joey Hunter MD sent at 3/19/2019  9:01 AM CDT -----  Results reviewed. Tests show no significant abnormalities.

## 2019-04-04 ENCOUNTER — TELEPHONE (OUTPATIENT)
Dept: FAMILY MEDICINE CLINIC | Facility: CLINIC | Age: 24
End: 2019-04-04

## 2019-04-04 NOTE — TELEPHONE ENCOUNTER
Patient Naina calling, wants to know if we can give her a vyvanse copay card to use to fill the script that she was given today, please call her to let her know

## 2019-05-07 ENCOUNTER — OFFICE VISIT (OUTPATIENT)
Dept: FAMILY MEDICINE CLINIC | Facility: CLINIC | Age: 24
End: 2019-05-07
Payer: COMMERCIAL

## 2019-05-07 VITALS
WEIGHT: 227 LBS | BODY MASS INDEX: 41.77 KG/M2 | HEIGHT: 62 IN | SYSTOLIC BLOOD PRESSURE: 110 MMHG | HEART RATE: 88 BPM | OXYGEN SATURATION: 98 % | DIASTOLIC BLOOD PRESSURE: 70 MMHG | TEMPERATURE: 98 F | RESPIRATION RATE: 16 BRPM

## 2019-05-07 DIAGNOSIS — E66.01 CLASS 3 SEVERE OBESITY DUE TO EXCESS CALORIES WITHOUT SERIOUS COMORBIDITY WITH BODY MASS INDEX (BMI) OF 40.0 TO 44.9 IN ADULT (HCC): ICD-10-CM

## 2019-05-07 DIAGNOSIS — J45.30 MILD PERSISTENT ASTHMA WITHOUT COMPLICATION: ICD-10-CM

## 2019-05-07 DIAGNOSIS — IMO0001 CONTRACEPTION: ICD-10-CM

## 2019-05-07 DIAGNOSIS — M54.6 CHRONIC BILATERAL THORACIC BACK PAIN: ICD-10-CM

## 2019-05-07 DIAGNOSIS — N92.6 IRREGULAR MENSES: Primary | ICD-10-CM

## 2019-05-07 DIAGNOSIS — G89.29 CHRONIC BILATERAL THORACIC BACK PAIN: ICD-10-CM

## 2019-05-07 DIAGNOSIS — F98.8 ATTENTION DEFICIT DISORDER, UNSPECIFIED HYPERACTIVITY PRESENCE: ICD-10-CM

## 2019-05-07 PROCEDURE — 81025 URINE PREGNANCY TEST: CPT | Performed by: FAMILY MEDICINE

## 2019-05-07 PROCEDURE — 99214 OFFICE O/P EST MOD 30 MIN: CPT | Performed by: FAMILY MEDICINE

## 2019-05-07 RX ORDER — METFORMIN HYDROCHLORIDE 500 MG/1
500 TABLET, EXTENDED RELEASE ORAL
Qty: 30 TABLET | Refills: 2 | Status: SHIPPED | OUTPATIENT
Start: 2019-05-07 | End: 2019-11-26 | Stop reason: ALTCHOICE

## 2019-05-07 RX ORDER — ALBUTEROL SULFATE 90 UG/1
2 AEROSOL, METERED RESPIRATORY (INHALATION) EVERY 6 HOURS PRN
Qty: 1 INHALER | Refills: 3 | Status: SHIPPED | OUTPATIENT
Start: 2019-05-07 | End: 2019-09-17

## 2019-05-07 NOTE — PROGRESS NOTES
[facility] [facility] Family Medicine Office Note  Chief Complaint:   Patient presents with:  Menstrual Problem: light periods, last cycle lasted 4 days      HPI:   This is a 26year old female coming in for  HPI    Prediabetes  Mom states age 16 - was put Negative for pain and visual disturbance. Respiratory: Negative for cough and shortness of breath. Cardiovascular: Negative for chest pain and palpitations. Gastrointestinal: Negative for abdominal pain, nausea and vomiting.    Genitourinary: Positiv consider breast reduction    4. Attention deficit disorder, unspecified hyperactivity presence  Tolerates vyvanse - ok to cont    5. Mild persistent asthma without complication  - Albuterol Sulfate  (90 Base) MCG/ACT Inhalation Aero Soln;  Inhale 2 p

## 2019-06-26 ENCOUNTER — TELEPHONE (OUTPATIENT)
Dept: INTERNAL MEDICINE CLINIC | Facility: CLINIC | Age: 24
End: 2019-06-26

## 2019-07-02 ENCOUNTER — TELEPHONE (OUTPATIENT)
Dept: FAMILY MEDICINE CLINIC | Facility: CLINIC | Age: 24
End: 2019-07-02

## 2019-07-02 NOTE — TELEPHONE ENCOUNTER
Pt is calling to get a note from dr Justina Alonzo that she is on [facility]. and why she is on this medication for her school

## 2019-07-02 NOTE — TELEPHONE ENCOUNTER
Last office visit:   5/7/2019    Requested medication: Lisdexamfetamine Dimesylate (VYVANSE) 40 MG Oral Cap     Pharmacy: paper rx

## 2019-07-02 NOTE — TELEPHONE ENCOUNTER
LOV 5/7/19  \"4. Attention deficit disorder, unspecified hyperactivity presence  Tolerates vyvanse - ok to cont  Return in about 6 weeks (around 6/18/2019) for back pain . \"  LF 6/3/19

## 2019-07-02 NOTE — TELEPHONE ENCOUNTER
Ok to write a note that pt is dx with Attention deficit disorder, unspecified hyperactivity presence and treated with Vyvanse 40mg daily.

## 2019-07-03 ENCOUNTER — TELEPHONE (OUTPATIENT)
Dept: FAMILY MEDICINE CLINIC | Facility: CLINIC | Age: 24
End: 2019-07-03

## 2019-07-08 NOTE — TELEPHONE ENCOUNTER
Form faxed to [facility] per pts request and JUSTIN. I called and LM on pts VM that it was faxed and the original is at the .

## 2019-07-09 NOTE — TELEPHONE ENCOUNTER
Nazanin Lopez from [employer] called to confirm that we faxed the form. She was concerned that maybe it was fraudulent because Dr Hawk Gomez didn't sign \"MD\". She was informed that we did fax it.   She requested a phone # for pt so that she can contact her to assist in si

## 2019-07-29 ENCOUNTER — OFFICE VISIT (OUTPATIENT)
Dept: INTERNAL MEDICINE CLINIC | Facility: CLINIC | Age: 24
End: 2019-07-29
Payer: COMMERCIAL

## 2019-07-29 VITALS
RESPIRATION RATE: 16 BRPM | HEIGHT: 62.5 IN | HEART RATE: 74 BPM | SYSTOLIC BLOOD PRESSURE: 110 MMHG | DIASTOLIC BLOOD PRESSURE: 70 MMHG | BODY MASS INDEX: 40.37 KG/M2 | WEIGHT: 225 LBS

## 2019-07-29 DIAGNOSIS — Z83.3 FAMILY HISTORY OF DIABETES MELLITUS IN FATHER: ICD-10-CM

## 2019-07-29 DIAGNOSIS — Z51.81 ENCOUNTER FOR THERAPEUTIC DRUG MONITORING: Primary | ICD-10-CM

## 2019-07-29 DIAGNOSIS — E66.01 CLASS 3 SEVERE OBESITY WITHOUT SERIOUS COMORBIDITY WITH BODY MASS INDEX (BMI) OF 40.0 TO 44.9 IN ADULT, UNSPECIFIED OBESITY TYPE (HCC): ICD-10-CM

## 2019-07-29 DIAGNOSIS — G89.29 CHRONIC BILATERAL THORACIC BACK PAIN: ICD-10-CM

## 2019-07-29 DIAGNOSIS — M54.6 CHRONIC BILATERAL THORACIC BACK PAIN: ICD-10-CM

## 2019-07-29 DIAGNOSIS — F90.9 ATTENTION DEFICIT HYPERACTIVITY DISORDER (ADHD), UNSPECIFIED ADHD TYPE: ICD-10-CM

## 2019-07-29 PROBLEM — E66.813 CLASS 3 SEVERE OBESITY WITHOUT SERIOUS COMORBIDITY WITH BODY MASS INDEX (BMI) OF 40.0 TO 44.9 IN ADULT: Status: ACTIVE | Noted: 2019-07-29

## 2019-07-29 PROBLEM — E66.813 CLASS 3 SEVERE OBESITY WITHOUT SERIOUS COMORBIDITY WITH BODY MASS INDEX (BMI) OF 40.0 TO 44.9 IN ADULT (HCC): Status: ACTIVE | Noted: 2019-07-29

## 2019-07-29 PROCEDURE — 99204 OFFICE O/P NEW MOD 45 MIN: CPT | Performed by: NURSE PRACTITIONER

## 2019-07-29 RX ORDER — TOPIRAMATE 25 MG/1
25 TABLET ORAL 2 TIMES DAILY
Qty: 60 TABLET | Refills: 2 | Status: SHIPPED | OUTPATIENT
Start: 2019-07-29 | End: 2019-08-28

## 2019-07-29 NOTE — PATIENT INSTRUCTIONS
PLAN:  Continue with medications: Topamax: take 1 tablet before dinner for the first week (to decrease side effects) and than the second week--> increase to 1 tablet in the morning and 1 tablet before dinner (2 tablets per day)   Go the lab for blood work exercise/activity which takes 7 minutes of your day and that you can do at home! 3. \"Calm\" or \"Headspace\" which helps with mindfulness, meditation, clarity, sleep, and caleb to your daily life.

## 2019-07-29 NOTE — PROGRESS NOTES
HISTORY OF PRESENT ILLNESS  Patient presents with:  Weight Problem: dr Rashawn Martinez referral. no weight loss meds.  5 days weekly exercise    Timmy Harris is a 26year old female new to our office today for initiation of medical weight loss program.  Patient p negative   Depression/anxiety: negative, ADD  Constipation: negative  DVT: negative  Family or personal history of Pancreatic issues / Medullary Thyroid Cancer: negative  History of bariatric surgery: negative    REVIEW OF SYSTEMS  GENERAL: negative for ac CHOLEST 167 03/18/2019    TRIG 92 03/18/2019    HDL 42 03/18/2019     (H) 03/18/2019    VLDL 18 03/18/2019    NONHDLC 125 03/18/2019     No results found for: B12, VITB12  Lab Results   Component Value Date    VITD 18.7 (L) 08/10/2018         Nelia Date: 07/29/19  Today's Weight: 225 lbs  5% Goal: 11.25  10% Goal: 22.5  Total Weight Loss: 0 lbs  Initial consult: 225 lbs on 7/29/2019, Down  Lb:  lbs total     Reviewed  [facility] patient contract.  Readiness for Lifestyle change: 10/10, Interest in Medication stress in weight management  · Weight Loss Contract reviewed and signed today 7/28/2019    Labs ordered today: , a1c, b12, vitamin d,     Patient Instructions   PLAN:  Continue with medications: Topamax: take 1 tablet before dinner for the first week (to d down your macros for the day (ie. Protein, carbs, fibers, sugars and fats). Try to stay within these numbers daily     2. \"7 minute workout\" to help with exercise/activity which takes 7 minutes of your day and that you can do at home! 3.  \"Calm\" or \"

## 2019-09-17 DIAGNOSIS — J45.30 MILD PERSISTENT ASTHMA WITHOUT COMPLICATION: ICD-10-CM

## 2019-09-17 RX ORDER — ALBUTEROL SULFATE 90 UG/1
2 AEROSOL, METERED RESPIRATORY (INHALATION) EVERY 6 HOURS PRN
Qty: 1 INHALER | Refills: 3 | Status: SHIPPED | OUTPATIENT
Start: 2019-09-17 | End: 2019-11-26

## 2019-09-17 NOTE — TELEPHONE ENCOUNTER
Medication(s) to Refill:   Requested Prescriptions     Pending Prescriptions Disp Refills   • Albuterol Sulfate  (90 Base) MCG/ACT Inhalation Aero Soln 1 Inhaler 3     Sig: Inhale 2 puffs into the lungs every 6 (six) hours as needed.          Reason

## 2019-09-23 ENCOUNTER — HOSPITAL ENCOUNTER (EMERGENCY)
Age: 24
Discharge: HOME | End: 2019-09-23
Attending: EMERGENCY MEDICINE
Payer: COMMERCIAL

## 2019-09-23 VITALS
BODY MASS INDEX: 36.8 KG/M2 | DIASTOLIC BLOOD PRESSURE: 73 MMHG | HEART RATE: 101 BPM | WEIGHT: 200 LBS | TEMPERATURE: 100 F | RESPIRATION RATE: 16 BRPM | HEIGHT: 62 IN | OXYGEN SATURATION: 100 % | SYSTOLIC BLOOD PRESSURE: 118 MMHG

## 2019-09-23 DIAGNOSIS — J02.0 STREP PHARYNGITIS: Primary | ICD-10-CM

## 2019-09-23 LAB
ALBUMIN SERPL-MCNC: 4 G/DL (ref 3.4–5)
ALBUMIN/GLOB SERPL: 1 {RATIO} (ref 1–2)
ALP LIVER SERPL-CCNC: 65 U/L (ref 52–144)
ALT SERPL-CCNC: 16 U/L (ref 13–56)
ANION GAP SERPL CALC-SCNC: 9 MMOL/L (ref 0–18)
AST SERPL-CCNC: 9 U/L (ref 15–37)
BASOPHILS # BLD AUTO: 0.04 X10(3) UL (ref 0–0.2)
BASOPHILS NFR BLD AUTO: 0.3 %
BILIRUB SERPL-MCNC: 0.5 MG/DL (ref 0.1–2)
BILIRUB UR QL STRIP.AUTO: NEGATIVE
BUN BLD-MCNC: 8 MG/DL (ref 7–18)
BUN/CREAT SERPL: 9.2 (ref 10–20)
CALCIUM BLD-MCNC: 9 MG/DL (ref 8.5–10.1)
CHLORIDE SERPL-SCNC: 102 MMOL/L (ref 98–112)
CLARITY UR REFRACT.AUTO: CLEAR
CO2 SERPL-SCNC: 24 MMOL/L (ref 21–32)
COLOR UR AUTO: YELLOW
CREAT BLD-MCNC: 0.87 MG/DL (ref 0.55–1.02)
DEPRECATED RDW RBC AUTO: 37.8 FL (ref 35.1–46.3)
EOSINOPHIL # BLD AUTO: 0.06 X10(3) UL (ref 0–0.7)
EOSINOPHIL NFR BLD AUTO: 0.4 %
ERYTHROCYTE [DISTWIDTH] IN BLOOD BY AUTOMATED COUNT: 12.4 % (ref 11–15)
GLOBULIN PLAS-MCNC: 4 G/DL (ref 2.8–4.4)
GLUCOSE BLD-MCNC: 95 MG/DL (ref 70–99)
GLUCOSE UR STRIP.AUTO-MCNC: NEGATIVE MG/DL
HCT VFR BLD AUTO: 42.8 % (ref 35–48)
HGB BLD-MCNC: 14.3 G/DL (ref 12–16)
IMM GRANULOCYTES # BLD AUTO: 0.07 X10(3) UL (ref 0–1)
IMM GRANULOCYTES NFR BLD: 0.5 %
LACTATE SERPL-SCNC: 1.3 MMOL/L (ref 0.4–2)
LEUKOCYTE ESTERASE UR QL STRIP.AUTO: NEGATIVE
LYMPHOCYTES # BLD AUTO: 1.01 X10(3) UL (ref 1–4)
LYMPHOCYTES NFR BLD AUTO: 6.6 %
M PROTEIN MFR SERPL ELPH: 8 G/DL (ref 6.4–8.2)
MCH RBC QN AUTO: 28.4 PG (ref 26–34)
MCHC RBC AUTO-ENTMCNC: 33.4 G/DL (ref 31–37)
MCV RBC AUTO: 84.9 FL (ref 80–100)
MONOCYTES # BLD AUTO: 0.62 X10(3) UL (ref 0.1–1)
MONOCYTES NFR BLD AUTO: 4 %
NEUTROPHILS # BLD AUTO: 13.58 X10 (3) UL (ref 1.5–7.7)
NEUTROPHILS # BLD AUTO: 13.58 X10(3) UL (ref 1.5–7.7)
NEUTROPHILS NFR BLD AUTO: 88.2 %
NITRITE UR QL STRIP.AUTO: NEGATIVE
OSMOLALITY SERPL CALC.SUM OF ELEC: 278 MOSM/KG (ref 275–295)
PH UR STRIP.AUTO: 8.5 [PH] (ref 4.5–8)
PLATELET # BLD AUTO: 245 10(3)UL (ref 150–450)
POCT LOT NUMBER: NORMAL
POCT URINE PREGNANCY: NEGATIVE
POTASSIUM SERPL-SCNC: 3.8 MMOL/L (ref 3.5–5.1)
PROT UR STRIP.AUTO-MCNC: NEGATIVE MG/DL
RBC # BLD AUTO: 5.04 X10(6)UL (ref 3.8–5.3)
SODIUM SERPL-SCNC: 135 MMOL/L (ref 136–145)
SP GR UR STRIP.AUTO: 1.01 (ref 1–1.03)
UROBILINOGEN UR STRIP.AUTO-MCNC: 0.2 MG/DL
WBC # BLD AUTO: 15.4 X10(3) UL (ref 4–11)

## 2019-09-23 PROCEDURE — 80053 COMPREHEN METABOLIC PANEL: CPT | Performed by: EMERGENCY MEDICINE

## 2019-09-23 PROCEDURE — 83605 ASSAY OF LACTIC ACID: CPT | Performed by: EMERGENCY MEDICINE

## 2019-09-23 PROCEDURE — 81025 URINE PREGNANCY TEST: CPT

## 2019-09-23 PROCEDURE — 99284 EMERGENCY DEPT VISIT MOD MDM: CPT

## 2019-09-23 PROCEDURE — 96360 HYDRATION IV INFUSION INIT: CPT

## 2019-09-23 PROCEDURE — 87430 STREP A AG IA: CPT | Performed by: EMERGENCY MEDICINE

## 2019-09-23 PROCEDURE — 81003 URINALYSIS AUTO W/O SCOPE: CPT | Performed by: EMERGENCY MEDICINE

## 2019-09-23 PROCEDURE — 85025 COMPLETE CBC W/AUTO DIFF WBC: CPT | Performed by: EMERGENCY MEDICINE

## 2019-09-23 RX ORDER — AMOXICILLIN AND CLAVULANATE POTASSIUM 875; 125 MG/1; MG/1
1 TABLET, FILM COATED ORAL ONCE
Status: COMPLETED | OUTPATIENT
Start: 2019-09-23 | End: 2019-09-23

## 2019-09-23 RX ORDER — IBUPROFEN 600 MG/1
600 TABLET ORAL ONCE
Status: COMPLETED | OUTPATIENT
Start: 2019-09-23 | End: 2019-09-23

## 2019-09-23 RX ORDER — AMOXICILLIN AND CLAVULANATE POTASSIUM 875; 125 MG/1; MG/1
1 TABLET, FILM COATED ORAL 2 TIMES DAILY
Qty: 20 TABLET | Refills: 0 | Status: SHIPPED | OUTPATIENT
Start: 2019-09-23 | End: 2019-10-03

## 2019-09-24 NOTE — ED PROVIDER NOTES
Patient Seen in: [facility] [place]      History   Patient presents with:  Fever (infectious)  Headache (neurologic)  Syncope (cardiovascular, neurologic)    Stated Complaint: FEVER/HEADACHE CO SYNCOPAL FEELING    HPI    Patient is enlarged tonsils with exudates. No tonsillar deviation. No stridor. , neck supple, no meningismus. Tympanic members normal bilaterally. LUNGS: Lungs clear to auscultation bilaterally. CARDIOVASCULAR: + S1-S2, regular rate and rhythm, no murmurs.   BACK: result                 Please view results for these tests on the individual orders. POCT PREGNANCY, URINE                  MDM     Patient was given IV fluids. Patient strep test was positive. Patient felt comfortable going home.   Recommend Tylenol, i

## 2019-09-27 ENCOUNTER — TELEPHONE (OUTPATIENT)
Dept: FAMILY MEDICINE CLINIC | Facility: CLINIC | Age: 24
End: 2019-09-27

## 2019-09-27 NOTE — TELEPHONE ENCOUNTER
Attempted to contact patient in [phone] and it just goes silent does not ring, unable to contact patient to schedule er follow up

## 2019-11-26 ENCOUNTER — TELEPHONE (OUTPATIENT)
Dept: FAMILY MEDICINE CLINIC | Facility: CLINIC | Age: 24
End: 2019-11-26

## 2019-11-26 ENCOUNTER — OFFICE VISIT (OUTPATIENT)
Dept: FAMILY MEDICINE CLINIC | Facility: CLINIC | Age: 24
End: 2019-11-26
Payer: COMMERCIAL

## 2019-11-26 VITALS
BODY MASS INDEX: 42.51 KG/M2 | RESPIRATION RATE: 16 BRPM | WEIGHT: 231 LBS | SYSTOLIC BLOOD PRESSURE: 120 MMHG | OXYGEN SATURATION: 98 % | TEMPERATURE: 98 F | HEART RATE: 90 BPM | HEIGHT: 62 IN | DIASTOLIC BLOOD PRESSURE: 70 MMHG

## 2019-11-26 DIAGNOSIS — Z00.00 ANNUAL PHYSICAL EXAM: Primary | ICD-10-CM

## 2019-11-26 DIAGNOSIS — Z23 NEED FOR VACCINATION: ICD-10-CM

## 2019-11-26 DIAGNOSIS — Z00.00 LABORATORY EXAMINATION ORDERED AS PART OF A ROUTINE GENERAL MEDICAL EXAMINATION: ICD-10-CM

## 2019-11-26 DIAGNOSIS — J45.30 MILD PERSISTENT ASTHMA WITHOUT COMPLICATION: ICD-10-CM

## 2019-11-26 DIAGNOSIS — N62 MACROMASTIA: ICD-10-CM

## 2019-11-26 DIAGNOSIS — E66.01 CLASS 3 SEVERE OBESITY WITHOUT SERIOUS COMORBIDITY WITH BODY MASS INDEX (BMI) OF 40.0 TO 44.9 IN ADULT, UNSPECIFIED OBESITY TYPE (HCC): ICD-10-CM

## 2019-11-26 DIAGNOSIS — G89.29 CHRONIC BILATERAL THORACIC BACK PAIN: ICD-10-CM

## 2019-11-26 DIAGNOSIS — F90.9 ATTENTION DEFICIT HYPERACTIVITY DISORDER (ADHD), UNSPECIFIED ADHD TYPE: ICD-10-CM

## 2019-11-26 DIAGNOSIS — M54.6 CHRONIC BILATERAL THORACIC BACK PAIN: ICD-10-CM

## 2019-11-26 PROCEDURE — 99213 OFFICE O/P EST LOW 20 MIN: CPT | Performed by: FAMILY MEDICINE

## 2019-11-26 PROCEDURE — 90471 IMMUNIZATION ADMIN: CPT | Performed by: FAMILY MEDICINE

## 2019-11-26 PROCEDURE — 90686 IIV4 VACC NO PRSV 0.5 ML IM: CPT | Performed by: FAMILY MEDICINE

## 2019-11-26 PROCEDURE — 99395 PREV VISIT EST AGE 18-39: CPT | Performed by: FAMILY MEDICINE

## 2019-11-26 RX ORDER — ALBUTEROL SULFATE 90 UG/1
2 AEROSOL, METERED RESPIRATORY (INHALATION) EVERY 6 HOURS PRN
Qty: 1 INHALER | Refills: 3 | Status: SHIPPED | OUTPATIENT
Start: 2019-11-26 | End: 2021-08-18

## 2019-11-26 RX ORDER — METFORMIN HYDROCHLORIDE 500 MG/1
500 TABLET, EXTENDED RELEASE ORAL
Qty: 90 TABLET | Refills: 2 | Status: SHIPPED | OUTPATIENT
Start: 2019-11-26 | End: 2020-05-19

## 2019-11-26 NOTE — PROGRESS NOTES
Issa Mcleod is a 22year old female. CC:  Patient presents with:   Well Adult: annual visit  Medication Follow-Up: pt wants to increase vyvanse  Back Pain: f/u      HPI:  HPV Vaccines(1 - Female 3-dose series) due on 09/12/2011  Annual Physical du anything, started to do anything, or prepared to do anything to end your life? (lifetime): No  Score and Suggested Actions - Office Visit: No Risk  Score and Intervention  Score and Suggested Actions - Office Visit: No Risk        Allergies:  No Known Fabien GI: denies nausea, vomiting, constipation, diarrhea, hematochezia, melena, heartburn  : no dysuria, urgency or frequency, no vaginal discharge  MUSCULOSKELETAL: no joint complaints upper or lower extremities  NEURO: no sensory or motor complaint  HEMAT 1 Inhaler; Refill: 3    4. Attention deficit hyperactivity disorder (ADHD), unspecified ADHD type  No controlled on previous dose - inc vyvanse to 50mg daily     5.  Chronic bilateral thoracic back pain  - PLASTIC SURGERY - INTERNAL  Due to macromastia

## 2019-11-26 NOTE — TELEPHONE ENCOUNTER
JUSTIN SENT TO    [facility]  PH: [phone]  FAX: [phone]    @ 4:00 PM, RECEIVED CONFIRMATION

## 2019-11-29 ENCOUNTER — MED REC SCAN ONLY (OUTPATIENT)
Dept: FAMILY MEDICINE CLINIC | Facility: CLINIC | Age: 24
End: 2019-11-29

## 2020-01-02 NOTE — TELEPHONE ENCOUNTER
Medication(s) to Refill:   Requested Prescriptions     Pending Prescriptions Disp Refills   • Lisdexamfetamine Dimesylate (VYVANSE) 50 MG Oral Cap 30 capsule 0     Sig: Take 1 capsule (50 mg total) by mouth every morning.          Reason for Medication Refi

## 2020-01-03 NOTE — TELEPHONE ENCOUNTER
Please call patient and inform her that 1 month was sent to the pharmacy but will need to be seen for the next refill.

## 2020-01-03 NOTE — TELEPHONE ENCOUNTER
I called the patient and informed of the below, Pt will call back to schedule her follow up appointment.

## 2020-03-03 ENCOUNTER — TELEPHONE (OUTPATIENT)
Dept: FAMILY MEDICINE CLINIC | Facility: CLINIC | Age: 25
End: 2020-03-03

## 2020-03-03 ENCOUNTER — OFFICE VISIT (OUTPATIENT)
Dept: SURGERY | Facility: CLINIC | Age: 25
End: 2020-03-03
Payer: COMMERCIAL

## 2020-03-03 VITALS
BODY MASS INDEX: 41.59 KG/M2 | HEART RATE: 63 BPM | DIASTOLIC BLOOD PRESSURE: 76 MMHG | HEIGHT: 62 IN | RESPIRATION RATE: 16 BRPM | WEIGHT: 226 LBS | TEMPERATURE: 98 F | SYSTOLIC BLOOD PRESSURE: 124 MMHG | OXYGEN SATURATION: 97 %

## 2020-03-03 DIAGNOSIS — N62 MACROMASTIA: Primary | ICD-10-CM

## 2020-03-03 PROCEDURE — 99243 OFF/OP CNSLTJ NEW/EST LOW 30: CPT | Performed by: SURGERY

## 2020-03-03 NOTE — CONSULTS
New Patient Consultation    This is the first visit for this 22year old male referred for evaluation for breast reduction. History of Present Illness: The patient is a 22year old referred by Dr. Hawk Gomez for evaluation for reduction mammoplasty.   The p gain.    Endocrine: There is no history of poor/slow wound healing, weight loss/gain, fertility or hormone problems, cold intolerance, heat intolerance, excessive thirst, or thyroid disease.      Allergic/Immunologic:  There is no history of hives, hay fe seizure/epilepsy, speech problems, coordination problems, trembling/tremors, fainting/black outs, dizziness, memory problems, loss of sensation/numbness, problems walking, weakness, tingling or burning in hands/feet.      Psychiatric:  There is no history o macromastia. Discussion and Plan:  Given the patient's current BMI, I recommended that she attempt weight loss prior to consideration for surgery and she is agreeable to this approach.   The patient was also counseled that the reduction mammoplasty will

## 2020-03-05 NOTE — TELEPHONE ENCOUNTER
Called pt to set up an appt for a follow up, pt states she will call back to schedule due to figuring out her work schedule.

## 2020-07-07 PROBLEM — Z34.90 PREGNANCY: Status: ACTIVE | Noted: 2020-07-07

## 2020-07-07 PROBLEM — Z34.90 PREGNANCY (HCC): Status: ACTIVE | Noted: 2020-07-07

## 2021-08-18 NOTE — PROGRESS NOTES
[address] [facility] Office Note  Chief Complaint:   Patient presents with:  Medication Follow-Up      HPI:   This is a 26year old female coming in for  HPI  ADHD   Is going back to school   Finds it difficult to concentrate so tough compl HENT: Negative for rhinorrhea and sinus pressure. Eyes: Negative for pain and visual disturbance. Respiratory: Negative for cough and shortness of breath. Cardiovascular: Negative for chest pain and palpitations.    Gastrointestinal: Negative for of 16years old. Patient's inattention and hyperactivity and impulsivity is affecting home, school, work, with friends and relatives, and in most activities. Symptoms affect day-to-day living.         Meds & Refills for this Visit:  Requested [facility] E

## 2021-09-14 ENCOUNTER — HOSPITAL ENCOUNTER (EMERGENCY)
Age: 26
Discharge: HOME | End: 2021-09-14
Attending: EMERGENCY MEDICINE
Payer: COMMERCIAL

## 2021-09-14 VITALS
WEIGHT: 210 LBS | HEIGHT: 62 IN | DIASTOLIC BLOOD PRESSURE: 64 MMHG | BODY MASS INDEX: 38.64 KG/M2 | SYSTOLIC BLOOD PRESSURE: 119 MMHG | OXYGEN SATURATION: 98 % | HEART RATE: 80 BPM | RESPIRATION RATE: 16 BRPM

## 2021-09-14 DIAGNOSIS — S09.90XA INJURY OF HEAD, INITIAL ENCOUNTER: Primary | ICD-10-CM

## 2021-09-14 PROCEDURE — 99283 EMERGENCY DEPT VISIT LOW MDM: CPT
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
## 2021-12-02 ENCOUNTER — TELEPHONE (OUTPATIENT)
Dept: FAMILY MEDICINE CLINIC | Facility: CLINIC | Age: 26
End: 2021-12-02

## 2021-12-02 NOTE — TELEPHONE ENCOUNTER
Pt calling and is requesting for medication refill of Lisdexamfetamine Dimesylate 50 MG Oral Capsule (Vyvanse). Pt was instructed to f/u in 3 months around 11/18/21. No appt scheduled. Please approve or deny pending Rx. Thank you.    LOV:08/18/21  LF: 10/19

## 2021-12-02 NOTE — TELEPHONE ENCOUNTER
Last office visit:  8/18/21schedule appointment)      Requested medication:     Lisdexamfetamine Dimesylate (VYVANSE) 50 MG Oral Cap     Pharmacy:    [facility]/PHARMACY #[id number]- Bruna Barr [address]  AT INTERSECTION OF [address] [facility], [phone], [phone]

## 2022-01-17 ENCOUNTER — TELEPHONE (OUTPATIENT)
Dept: FAMILY MEDICINE CLINIC | Facility: CLINIC | Age: 27
End: 2022-01-17

## 2022-01-17 NOTE — TELEPHONE ENCOUNTER
Received verbal order from PCP to reschedule physical tomorrow. Ok to fill Vyvanse for 30 days. Attempted to call patient to notify of the above. No answer. Left message stating that appointment was canceled, but 30 day rx will be sent to pharmacy.  Sta

## 2022-02-15 ENCOUNTER — OFFICE VISIT (OUTPATIENT)
Dept: FAMILY MEDICINE CLINIC | Facility: CLINIC | Age: 27
End: 2022-02-15
Payer: COMMERCIAL

## 2022-02-15 VITALS
HEART RATE: 75 BPM | TEMPERATURE: 98 F | WEIGHT: 240 LBS | RESPIRATION RATE: 21 BRPM | OXYGEN SATURATION: 100 % | SYSTOLIC BLOOD PRESSURE: 106 MMHG | BODY MASS INDEX: 44.16 KG/M2 | HEIGHT: 62 IN | DIASTOLIC BLOOD PRESSURE: 60 MMHG

## 2022-02-15 DIAGNOSIS — E66.01 CLASS 3 SEVERE OBESITY WITHOUT SERIOUS COMORBIDITY WITH BODY MASS INDEX (BMI) OF 40.0 TO 44.9 IN ADULT, UNSPECIFIED OBESITY TYPE (HCC): ICD-10-CM

## 2022-02-15 DIAGNOSIS — F90.9 ATTENTION DEFICIT HYPERACTIVITY DISORDER (ADHD), UNSPECIFIED ADHD TYPE: ICD-10-CM

## 2022-02-15 DIAGNOSIS — Z00.00 LABORATORY EXAMINATION ORDERED AS PART OF A ROUTINE GENERAL MEDICAL EXAMINATION: ICD-10-CM

## 2022-02-15 DIAGNOSIS — Z00.00 ROUTINE GENERAL MEDICAL EXAMINATION AT HEALTH CARE FACILITY: Primary | ICD-10-CM

## 2022-02-15 DIAGNOSIS — J45.20 MILD INTERMITTENT ASTHMA WITHOUT COMPLICATION: ICD-10-CM

## 2022-02-15 PROCEDURE — 3078F DIAST BP <80 MM HG: CPT | Performed by: FAMILY MEDICINE

## 2022-02-15 PROCEDURE — 3008F BODY MASS INDEX DOCD: CPT | Performed by: FAMILY MEDICINE

## 2022-02-15 PROCEDURE — 3074F SYST BP LT 130 MM HG: CPT | Performed by: FAMILY MEDICINE

## 2022-02-15 PROCEDURE — 99395 PREV VISIT EST AGE 18-39: CPT | Performed by: FAMILY MEDICINE

## 2022-02-15 RX ORDER — FLUOXETINE HYDROCHLORIDE 20 MG/1
20 CAPSULE ORAL DAILY
Qty: 30 CAPSULE | Refills: 2 | Status: SHIPPED | OUTPATIENT
Start: 2022-02-15

## 2022-02-15 RX ORDER — DEXTROAMPHETAMINE SACCHARATE, AMPHETAMINE ASPARTATE, DEXTROAMPHETAMINE SULFATE AND AMPHETAMINE SULFATE 1.25; 1.25; 1.25; 1.25 MG/1; MG/1; MG/1; MG/1
5 TABLET ORAL
Qty: 30 TABLET | Refills: 0 | Status: SHIPPED | OUTPATIENT
Start: 2022-02-15 | End: 2022-03-25

## 2022-02-25 ENCOUNTER — TELEPHONE (OUTPATIENT)
Dept: FAMILY MEDICINE CLINIC | Facility: CLINIC | Age: 27
End: 2022-02-25

## 2022-02-25 NOTE — TELEPHONE ENCOUNTER
Pt called and [facility] the [facility] [address] [facility] the [facility]se where the script was sent last week so she is asking if the [facility] can be [facility] [address] and [address] sent to provider to sign/send

## 2022-03-24 NOTE — TELEPHONE ENCOUNTER
Pt requesting a refill on amphetamine-dextroamphetamine 5 MG Oral Tab   Send to [facility]/PHARMACY #[id number]- Natasha Slice - [address] [facility]  AT [address] OF [address], [phone], [phone]    Pt scheduled appt 418/22 for follow up

## 2022-03-24 NOTE — TELEPHONE ENCOUNTER
Pt calling and is requesting for medication refill of Amphetamine-Dextroamphetamine 5 MG Oral Tablet (ADDERALL). Please approve or deny pending Rx. Thank you.    LOV: 02/15/22  LF: 02/15/22

## 2022-03-25 RX ORDER — DEXTROAMPHETAMINE SACCHARATE, AMPHETAMINE ASPARTATE, DEXTROAMPHETAMINE SULFATE AND AMPHETAMINE SULFATE 1.25; 1.25; 1.25; 1.25 MG/1; MG/1; MG/1; MG/1
5 TABLET ORAL
Qty: 30 TABLET | Refills: 0 | Status: SHIPPED | OUTPATIENT
Start: 2022-03-25 | End: 2022-04-24

## 2022-04-09 ENCOUNTER — OFFICE VISIT (OUTPATIENT)
Dept: FAMILY MEDICINE CLINIC | Facility: CLINIC | Age: 27
End: 2022-04-09
Payer: COMMERCIAL

## 2022-04-09 VITALS
TEMPERATURE: 98 F | RESPIRATION RATE: 16 BRPM | WEIGHT: 246.19 LBS | HEART RATE: 71 BPM | SYSTOLIC BLOOD PRESSURE: 104 MMHG | BODY MASS INDEX: 43.62 KG/M2 | HEIGHT: 63 IN | OXYGEN SATURATION: 99 % | DIASTOLIC BLOOD PRESSURE: 72 MMHG

## 2022-04-09 DIAGNOSIS — L24.3 IRRITANT CONTACT DERMATITIS DUE TO COSMETICS: Primary | ICD-10-CM

## 2022-04-09 DIAGNOSIS — N92.6 MISSED PERIOD: ICD-10-CM

## 2022-04-09 LAB
CONTROL LINE PRESENT WITH A CLEAR BACKGROUND (YES/NO): YES YES/NO
PREGNANCY TEST, URINE: NEGATIVE

## 2022-04-09 PROCEDURE — 81025 URINE PREGNANCY TEST: CPT | Performed by: NURSE PRACTITIONER

## 2022-04-09 PROCEDURE — 3008F BODY MASS INDEX DOCD: CPT | Performed by: NURSE PRACTITIONER

## 2022-04-09 PROCEDURE — 99213 OFFICE O/P EST LOW 20 MIN: CPT | Performed by: NURSE PRACTITIONER

## 2022-04-09 PROCEDURE — 3074F SYST BP LT 130 MM HG: CPT | Performed by: NURSE PRACTITIONER

## 2022-04-09 PROCEDURE — 3078F DIAST BP <80 MM HG: CPT | Performed by: NURSE PRACTITIONER

## 2022-05-09 ENCOUNTER — LAB ENCOUNTER (OUTPATIENT)
Dept: LAB | Age: 27
End: 2022-05-09
Attending: FAMILY MEDICINE
Payer: COMMERCIAL

## 2022-05-09 ENCOUNTER — OFFICE VISIT (OUTPATIENT)
Dept: FAMILY MEDICINE CLINIC | Facility: CLINIC | Age: 27
End: 2022-05-09
Payer: COMMERCIAL

## 2022-05-09 VITALS
WEIGHT: 246 LBS | SYSTOLIC BLOOD PRESSURE: 116 MMHG | RESPIRATION RATE: 16 BRPM | TEMPERATURE: 98 F | HEART RATE: 70 BPM | OXYGEN SATURATION: 100 % | DIASTOLIC BLOOD PRESSURE: 60 MMHG | BODY MASS INDEX: 43.59 KG/M2 | HEIGHT: 63 IN

## 2022-05-09 DIAGNOSIS — F90.9 ATTENTION DEFICIT HYPERACTIVITY DISORDER (ADHD), UNSPECIFIED ADHD TYPE: ICD-10-CM

## 2022-05-09 DIAGNOSIS — Z00.00 LABORATORY EXAMINATION ORDERED AS PART OF A ROUTINE GENERAL MEDICAL EXAMINATION: ICD-10-CM

## 2022-05-09 DIAGNOSIS — E66.01 CLASS 3 SEVERE OBESITY WITHOUT SERIOUS COMORBIDITY WITH BODY MASS INDEX (BMI) OF 40.0 TO 44.9 IN ADULT, UNSPECIFIED OBESITY TYPE (HCC): Primary | ICD-10-CM

## 2022-05-09 DIAGNOSIS — E66.01 CLASS 3 SEVERE OBESITY WITHOUT SERIOUS COMORBIDITY WITH BODY MASS INDEX (BMI) OF 40.0 TO 44.9 IN ADULT, UNSPECIFIED OBESITY TYPE (HCC): ICD-10-CM

## 2022-05-09 LAB
ALBUMIN SERPL-MCNC: 3.7 G/DL (ref 3.4–5)
ALBUMIN/GLOB SERPL: 1.1 {RATIO} (ref 1–2)
ALP LIVER SERPL-CCNC: 55 U/L
ALT SERPL-CCNC: 19 U/L
ANION GAP SERPL CALC-SCNC: 7 MMOL/L (ref 0–18)
AST SERPL-CCNC: 15 U/L (ref 15–37)
BASOPHILS # BLD AUTO: 0.05 X10(3) UL (ref 0–0.2)
BASOPHILS NFR BLD AUTO: 0.6 %
BILIRUB SERPL-MCNC: 0.2 MG/DL (ref 0.1–2)
BUN BLD-MCNC: 9 MG/DL (ref 7–18)
CALCIUM BLD-MCNC: 8.8 MG/DL (ref 8.5–10.1)
CHLORIDE SERPL-SCNC: 106 MMOL/L (ref 98–112)
CO2 SERPL-SCNC: 23 MMOL/L (ref 21–32)
CREAT BLD-MCNC: 0.53 MG/DL
EOSINOPHIL # BLD AUTO: 0.29 X10(3) UL (ref 0–0.7)
EOSINOPHIL NFR BLD AUTO: 3.3 %
ERYTHROCYTE [DISTWIDTH] IN BLOOD BY AUTOMATED COUNT: 13 %
EST. AVERAGE GLUCOSE BLD GHB EST-MCNC: 97 MG/DL (ref 68–126)
FASTING STATUS PATIENT QL REPORTED: NO
GLOBULIN PLAS-MCNC: 3.5 G/DL (ref 2.8–4.4)
GLUCOSE BLD-MCNC: 86 MG/DL (ref 70–99)
HBA1C MFR BLD: 5 % (ref ?–5.7)
HCT VFR BLD AUTO: 40.5 %
HGB BLD-MCNC: 13.3 G/DL
IMM GRANULOCYTES # BLD AUTO: 0.05 X10(3) UL (ref 0–1)
IMM GRANULOCYTES NFR BLD: 0.6 %
LYMPHOCYTES # BLD AUTO: 2.41 X10(3) UL (ref 1–4)
LYMPHOCYTES NFR BLD AUTO: 27.7 %
MCH RBC QN AUTO: 28 PG (ref 26–34)
MCHC RBC AUTO-ENTMCNC: 32.8 G/DL (ref 31–37)
MCV RBC AUTO: 85.3 FL
MONOCYTES # BLD AUTO: 0.53 X10(3) UL (ref 0.1–1)
MONOCYTES NFR BLD AUTO: 6.1 %
NEUTROPHILS # BLD AUTO: 5.38 X10 (3) UL (ref 1.5–7.7)
NEUTROPHILS # BLD AUTO: 5.38 X10(3) UL (ref 1.5–7.7)
NEUTROPHILS NFR BLD AUTO: 61.7 %
OSMOLALITY SERPL CALC.SUM OF ELEC: 280 MOSM/KG (ref 275–295)
PLATELET # BLD AUTO: 263 10(3)UL (ref 150–450)
POTASSIUM SERPL-SCNC: 4.1 MMOL/L (ref 3.5–5.1)
PROT SERPL-MCNC: 7.2 G/DL (ref 6.4–8.2)
RBC # BLD AUTO: 4.75 X10(6)UL
SODIUM SERPL-SCNC: 136 MMOL/L (ref 136–145)
TSI SER-ACNC: 2.06 MIU/ML (ref 0.36–3.74)
WBC # BLD AUTO: 8.7 X10(3) UL (ref 4–11)

## 2022-05-09 PROCEDURE — 3074F SYST BP LT 130 MM HG: CPT | Performed by: FAMILY MEDICINE

## 2022-05-09 PROCEDURE — 80050 GENERAL HEALTH PANEL: CPT | Performed by: FAMILY MEDICINE

## 2022-05-09 PROCEDURE — 99214 OFFICE O/P EST MOD 30 MIN: CPT | Performed by: FAMILY MEDICINE

## 2022-05-09 PROCEDURE — 3008F BODY MASS INDEX DOCD: CPT | Performed by: FAMILY MEDICINE

## 2022-05-09 PROCEDURE — 3078F DIAST BP <80 MM HG: CPT | Performed by: FAMILY MEDICINE

## 2022-05-09 PROCEDURE — 83036 HEMOGLOBIN GLYCOSYLATED A1C: CPT | Performed by: FAMILY MEDICINE

## 2022-05-09 RX ORDER — DEXTROAMPHETAMINE SACCHARATE, AMPHETAMINE ASPARTATE, DEXTROAMPHETAMINE SULFATE AND AMPHETAMINE SULFATE 1.25; 1.25; 1.25; 1.25 MG/1; MG/1; MG/1; MG/1
5 TABLET ORAL DAILY
Qty: 30 TABLET | Refills: 0 | Status: SHIPPED | OUTPATIENT
Start: 2022-06-09 | End: 2022-07-09

## 2022-05-09 RX ORDER — DEXTROAMPHETAMINE SACCHARATE, AMPHETAMINE ASPARTATE, DEXTROAMPHETAMINE SULFATE AND AMPHETAMINE SULFATE 1.25; 1.25; 1.25; 1.25 MG/1; MG/1; MG/1; MG/1
5 TABLET ORAL DAILY
Qty: 30 TABLET | Refills: 0 | Status: SHIPPED | OUTPATIENT
Start: 2022-07-10 | End: 2022-08-09

## 2022-05-09 RX ORDER — DEXTROAMPHETAMINE SACCHARATE, AMPHETAMINE ASPARTATE, DEXTROAMPHETAMINE SULFATE AND AMPHETAMINE SULFATE 1.25; 1.25; 1.25; 1.25 MG/1; MG/1; MG/1; MG/1
5 TABLET ORAL DAILY
Qty: 30 TABLET | Refills: 0 | Status: SHIPPED | OUTPATIENT
Start: 2022-05-09 | End: 2022-06-08

## 2022-10-17 ENCOUNTER — TELEPHONE (OUTPATIENT)
Dept: FAMILY MEDICINE CLINIC | Facility: CLINIC | Age: 27
End: 2022-10-17

## 2022-10-17 NOTE — TELEPHONE ENCOUNTER
Patient is 30 weeks pregnant and said she woke up with her Jawline swollen. She said she contacted her OB/GYN and they recommended to call us and prescribe possibly Penicillan. Shes afraid if its an infection it might spread to the baby. Please Advise   [facility]/pharmacy #[id number]- Natasha Slice - [address]  AT [address]SECTION OF [facility] [address], [phone], [phone]

## 2022-10-18 NOTE — TELEPHONE ENCOUNTER
Pt called back and she said her tooth is hurting and she will be 31 weeks pregnant tomorrow. She called gyne and they told her she could take PCN. She said that her dentist probably could not do anything and she really wants to be seen because she is worried about passing any infection to the baby.   Last OV 5/9/22  Appt scheduled for 8am tomorrow with Dr. Emanuel Wayne

## 2022-12-05 ENCOUNTER — OFFICE VISIT (OUTPATIENT)
Dept: FAMILY MEDICINE CLINIC | Facility: CLINIC | Age: 27
End: 2022-12-05
Payer: MEDICAID

## 2022-12-05 VITALS
BODY MASS INDEX: 46.07 KG/M2 | SYSTOLIC BLOOD PRESSURE: 112 MMHG | HEART RATE: 110 BPM | RESPIRATION RATE: 18 BRPM | HEIGHT: 63 IN | DIASTOLIC BLOOD PRESSURE: 63 MMHG | WEIGHT: 260 LBS | TEMPERATURE: 97 F

## 2022-12-05 DIAGNOSIS — R68.89 FLU-LIKE SYMPTOMS: Primary | ICD-10-CM

## 2022-12-05 PROCEDURE — 99213 OFFICE O/P EST LOW 20 MIN: CPT | Performed by: NURSE PRACTITIONER

## 2022-12-05 PROCEDURE — 87637 SARSCOV2&INF A&B&RSV AMP PRB: CPT | Performed by: NURSE PRACTITIONER

## 2022-12-05 PROCEDURE — 3008F BODY MASS INDEX DOCD: CPT | Performed by: NURSE PRACTITIONER

## 2022-12-05 PROCEDURE — 3074F SYST BP LT 130 MM HG: CPT | Performed by: NURSE PRACTITIONER

## 2022-12-05 PROCEDURE — 3078F DIAST BP <80 MM HG: CPT | Performed by: NURSE PRACTITIONER

## 2022-12-05 RX ORDER — MULTIVIT,IRON,MINERALS/LUTEIN
TABLET ORAL
COMMUNITY

## 2022-12-09 LAB
FLUAV + FLUBV RNA SPEC NAA+PROBE: DETECTED
FLUAV + FLUBV RNA SPEC NAA+PROBE: NOT DETECTED
RSV RNA SPEC NAA+PROBE: NOT DETECTED
SARS-COV-2 RNA RESP QL NAA+PROBE: NOT DETECTED

## 2023-08-15 NOTE — ED INITIAL ASSESSMENT (HPI)
Fever, headache, sore throat and low back pain onset this morning.  Fever 103.2 this am Time In: 1100  Time Out: [address] Verónica Horse, PT       Charge Capture  }Post Session Pain  PT Visit Info  MedBridge Portal  MD Guidelines  Scanned Media  Benefits  MyChart    Future Appointments   Date Time Provider 4600  [address]   8/17/2023 11:00 AM Erwin Acosta, PT [facility]   8/22/2023 11:00 AM Erwin Acosta, PT [facility]   8/24/2023 11:00 AM Erwin Acosta PT [facility]   9/21/2023 10:15 AM Tawanna Goltz, PA [facility]   10/5/2023  9:45 AM [facility]   10/12/2023  8:40 AM Nona Posada MD [facility]   11/9/2023  3:40 PM Peewee Mcqueen MD [id number]

## 2023-11-22 NOTE — TELEPHONE ENCOUNTER
Pt picked up rx and I verified id. Zelda Jiménez states she is out of meds and its eeffecting her need propranolol, klonopin , hydroxyzine filled

## 2024-11-06 ENCOUNTER — HOSPITAL ENCOUNTER (EMERGENCY)
Age: 29
Discharge: HOME | End: 2024-11-06
Attending: STUDENT IN AN ORGANIZED HEALTH CARE EDUCATION/TRAINING PROGRAM
Payer: MEDICAID

## 2024-11-06 ENCOUNTER — NURSE TRIAGE (OUTPATIENT)
Dept: FAMILY MEDICINE CLINIC | Facility: CLINIC | Age: 29
End: 2024-11-06

## 2024-11-06 ENCOUNTER — APPOINTMENT (OUTPATIENT)
Dept: CT IMAGING | Age: 29
End: 2024-11-06
Attending: STUDENT IN AN ORGANIZED HEALTH CARE EDUCATION/TRAINING PROGRAM
Payer: MEDICAID

## 2024-11-06 VITALS
DIASTOLIC BLOOD PRESSURE: 70 MMHG | RESPIRATION RATE: 16 BRPM | TEMPERATURE: 98 F | HEART RATE: 76 BPM | HEIGHT: 63 IN | BODY MASS INDEX: 40.75 KG/M2 | OXYGEN SATURATION: 98 % | SYSTOLIC BLOOD PRESSURE: 106 MMHG | WEIGHT: 230 LBS

## 2024-11-06 DIAGNOSIS — H53.8 BLURRY VISION, LEFT EYE: ICD-10-CM

## 2024-11-06 DIAGNOSIS — R51.9 ACUTE NONINTRACTABLE HEADACHE, UNSPECIFIED HEADACHE TYPE: Primary | ICD-10-CM

## 2024-11-06 LAB
ALBUMIN SERPL-MCNC: 3.6 G/DL (ref 3.4–5)
ALBUMIN/GLOB SERPL: 1 {RATIO} (ref 1–2)
ALP LIVER SERPL-CCNC: 67 U/L
ALT SERPL-CCNC: 21 U/L
ANION GAP SERPL CALC-SCNC: 4 MMOL/L (ref 0–18)
AST SERPL-CCNC: 9 U/L (ref 15–37)
B-HCG UR QL: NEGATIVE
BASOPHILS # BLD AUTO: 0.05 X10(3) UL (ref 0–0.2)
BASOPHILS NFR BLD AUTO: 0.6 %
BILIRUB SERPL-MCNC: 0.2 MG/DL (ref 0.1–2)
BUN BLD-MCNC: 12 MG/DL (ref 9–23)
CALCIUM BLD-MCNC: 9.1 MG/DL (ref 8.5–10.1)
CHLORIDE SERPL-SCNC: 107 MMOL/L (ref 98–112)
CO2 SERPL-SCNC: 25 MMOL/L (ref 21–32)
CREAT BLD-MCNC: 0.67 MG/DL
EGFRCR SERPLBLD CKD-EPI 2021: 122 ML/MIN/1.73M2 (ref 60–?)
EOSINOPHIL # BLD AUTO: 0.2 X10(3) UL (ref 0–0.7)
EOSINOPHIL NFR BLD AUTO: 2.5 %
ERYTHROCYTE [DISTWIDTH] IN BLOOD BY AUTOMATED COUNT: 13 %
GLOBULIN PLAS-MCNC: 3.7 G/DL (ref 2.8–4.4)
GLUCOSE BLD-MCNC: 89 MG/DL (ref 70–99)
HCT VFR BLD AUTO: 41.4 %
HGB BLD-MCNC: 14 G/DL
IMM GRANULOCYTES # BLD AUTO: 0.03 X10(3) UL (ref 0–1)
IMM GRANULOCYTES NFR BLD: 0.4 %
LYMPHOCYTES # BLD AUTO: 2.87 X10(3) UL (ref 1–4)
LYMPHOCYTES NFR BLD AUTO: 36.3 %
MCH RBC QN AUTO: 28.2 PG (ref 26–34)
MCHC RBC AUTO-ENTMCNC: 33.8 G/DL (ref 31–37)
MCV RBC AUTO: 83.3 FL
MONOCYTES # BLD AUTO: 0.51 X10(3) UL (ref 0.1–1)
MONOCYTES NFR BLD AUTO: 6.5 %
NEUTROPHILS # BLD AUTO: 4.24 X10 (3) UL (ref 1.5–7.7)
NEUTROPHILS # BLD AUTO: 4.24 X10(3) UL (ref 1.5–7.7)
NEUTROPHILS NFR BLD AUTO: 53.7 %
OSMOLALITY SERPL CALC.SUM OF ELEC: 281 MOSM/KG (ref 275–295)
PLATELET # BLD AUTO: 270 10(3)UL (ref 150–450)
POTASSIUM SERPL-SCNC: 3.7 MMOL/L (ref 3.5–5.1)
PROT SERPL-MCNC: 7.3 G/DL (ref 6.4–8.2)
RBC # BLD AUTO: 4.97 X10(6)UL
SODIUM SERPL-SCNC: 136 MMOL/L (ref 136–145)
WBC # BLD AUTO: 7.9 X10(3) UL (ref 4–11)

## 2024-11-06 PROCEDURE — 80053 COMPREHEN METABOLIC PANEL: CPT | Performed by: STUDENT IN AN ORGANIZED HEALTH CARE EDUCATION/TRAINING PROGRAM

## 2024-11-06 PROCEDURE — 70498 CT ANGIOGRAPHY NECK: CPT | Performed by: STUDENT IN AN ORGANIZED HEALTH CARE EDUCATION/TRAINING PROGRAM

## 2024-11-06 PROCEDURE — 85025 COMPLETE CBC W/AUTO DIFF WBC: CPT | Performed by: STUDENT IN AN ORGANIZED HEALTH CARE EDUCATION/TRAINING PROGRAM

## 2024-11-06 PROCEDURE — 99285 EMERGENCY DEPT VISIT HI MDM: CPT

## 2024-11-06 PROCEDURE — 96365 THER/PROPH/DIAG IV INF INIT: CPT

## 2024-11-06 PROCEDURE — 81025 URINE PREGNANCY TEST: CPT

## 2024-11-06 PROCEDURE — 70496 CT ANGIOGRAPHY HEAD: CPT | Performed by: STUDENT IN AN ORGANIZED HEALTH CARE EDUCATION/TRAINING PROGRAM

## 2024-11-06 PROCEDURE — 96375 TX/PRO/DX INJ NEW DRUG ADDON: CPT

## 2024-11-06 PROCEDURE — 99284 EMERGENCY DEPT VISIT MOD MDM: CPT

## 2024-11-06 RX ORDER — SUMATRIPTAN 50 MG/1
50 TABLET, FILM COATED ORAL EVERY 2 HOUR PRN
Qty: 6 TABLET | Refills: 0 | Status: SHIPPED | OUTPATIENT
Start: 2024-11-06 | End: 2024-12-06

## 2024-11-06 RX ORDER — DIPHENHYDRAMINE HYDROCHLORIDE 50 MG/ML
25 INJECTION INTRAMUSCULAR; INTRAVENOUS ONCE
Status: COMPLETED | OUTPATIENT
Start: 2024-11-06 | End: 2024-11-06

## 2024-11-06 RX ORDER — ACETAMINOPHEN 500 MG
1000 TABLET ORAL ONCE
Status: COMPLETED | OUTPATIENT
Start: 2024-11-06 | End: 2024-11-06

## 2024-11-06 RX ORDER — MAGNESIUM SULFATE 1 G/100ML
1 INJECTION INTRAVENOUS ONCE
Status: COMPLETED | OUTPATIENT
Start: 2024-11-06 | End: 2024-11-06

## 2024-11-06 RX ORDER — METOCLOPRAMIDE HYDROCHLORIDE 5 MG/ML
10 INJECTION INTRAMUSCULAR; INTRAVENOUS ONCE
Status: COMPLETED | OUTPATIENT
Start: 2024-11-06 | End: 2024-11-06

## 2024-11-06 NOTE — TELEPHONE ENCOUNTER
Action Requested: Summary for Provider     []  Critical Lab, Recommendations Needed  [x] Need Additional Advice  []   FYI    [x]   Need Orders  [] Need Medications Sent to Pharmacy  []  Other     SUMMARY: requesting appt, appt on Friday with Dr. Melchor 10 am, ok to see patient? Patient notes she is interested in switching to Dr. Melchor as PCP and is willing to see him for this headache    Reason for call: Headache  Onset: 1 day ago     Patient called in stating she developed a headache with blurred vision to her left eye last night, notes the blurred vision looked like pixels, she took tylenol and was able to go to sleep, headache and blurred vision better now, requesting to be seen.   No appointments available today with Dr. Vail or Herve.   Patient asking if she can see raheem Silverman at 10am on Friday if Dr. Melchor is agreeable  Patient notes she is interested in switching to Dr. Melchor as her PCP if he will accept this as well.       Reason for Disposition   Patient wants to be seen    Protocols used: Headache-A-OH    Patient call back [phone]

## 2024-11-06 NOTE — TELEPHONE ENCOUNTER
Sounds like a migraine   Rec tylenol and iburofen as needed  or try excedrin (aspirin + acetaminophen + caffeine)   I can see her Friday if needed

## 2024-11-06 NOTE — TELEPHONE ENCOUNTER
Called and spoke with pt. Notified pt of message below from Dr. Vail. Pt verbalizes understanding and is agreeable to plan. Pt asking if Friday she can come in around 1:45pm? Pt also stated she is supposed to work tonight and looking for letter to give to work. Also wants new rx for vyvanse. Stated she is going back to school but unsure if new insurance covers vyvanse or adderall. Advised pt to nikos insurance and see which medication is covered and can discuss at OV. Pt verbalizes understanding and is agreeable to plan.       Dr. Vail - Ok to add to schedule around 1:45pm on Friday? Pt is supposed to work tonight and looking for letter to provide to work, please advise if you would provide her with letter

## 2024-11-06 NOTE — TELEPHONE ENCOUNTER
Will discuss stimulant rx at visit (has not been seen in over 1 year - cannot prescribe controlled substances wo up to date visit)     Is the note needed for missed work? - did she miss work last night?   Ok for note for last night or tonight   Ok to schedule at 1:45 (double book in 1;30 appt slot)

## 2024-11-06 NOTE — TELEPHONE ENCOUNTER
Called and spoke to pt. Informed her stimulant can be discussed at office visit, pt stated understanding. Can schedule at 1:45 on Friday and send work note for tonight (pt missing work tonight). Letter sent, pt appreciative. Patient then started to bring up vision changes, stated when she looks straight her vision is clear, but peripheral vision is \"fuzzy\". Patient concerned about this. Given vision changes, advised to be seen in ER today and can follow-up Friday with Dr. Vail in the office. Patient stated understanding, is going to go to [place] [facility] today.

## 2024-11-06 NOTE — TELEPHONE ENCOUNTER
Received call from pt with update on adderall. She did speak to her insurance and they will cover adderall with a prior authorization. Informed her message was to Dr. Vail about note and appointment Friday, we will call her with response once received. Patient appreciative.

## 2024-11-07 NOTE — ED INITIAL ASSESSMENT (HPI)
Headache started last night at 9pm, at 11pm  started having blurred vision in left eye, still having \"hazy\" vision to left outer eye, headache is not as bad, took Excedrin at 3pm, denies numbness/tingling/nausea/vomiting

## 2024-11-07 NOTE — ED QUICK NOTES
The Heplock was dc'd with the tip intact. DC instr were given to f/u with the neurologist and eye MD she was given in 1-2 days. To take the med as prescribed and to return to the nearest ER if any problems develop. She expressed an understanding and was dc'd home feeling much better,compared to her arrival.

## 2024-11-07 NOTE — ED QUICK NOTES
To ER for eval of a left sided headache that started last night  at  ~ 2230.She felt like \"the vision in my eye changed and it seems like it's pixels\". The vision remains abnormal and her peripheral vision has decreased. Her central vision is normal. She took Excedrin Migraine with some relief of pain. She denies any head injury or eye injury.

## 2024-11-07 NOTE — ED PROVIDER NOTES
Patient Seen in: [facility] [place]      History     Chief Complaint   Patient presents with    Headache     Stated Complaint: headache and blurred vision in left eye since yesterday    Subjective:   HPI    Patient is a 29-year-old female who presents to the emergency department for evaluation of headache has been ongoing since yesterday with blurry vision in the left eye only.  Symptoms began yesterday.  Pain improved with Advil.  Patient denies any balance problem, loss of sensation, fever, cough, neck stiffness.  She does report intermittent severe pain in the right knee that makes it difficult to walk that has been ongoing for the last 3 months.  No change in that today.    Objective:   Past Medical History:    ADHD (attention deficit hyperactivity disorder)    Asthma (HCC)    Extrinsic asthma, unspecified    Varicella              Past Surgical History:   Procedure Laterality Date                      Social History     Socioeconomic History    Marital status: Single   Tobacco Use    Smoking status: Never    Smokeless tobacco: Never   Vaping Use    Vaping status: Never Used   Substance and Sexual Activity    Alcohol use: Not Currently    Drug use: No     Frequency: 1.0 times per week              Review of Systems    Positive for stated complaint: headache and blurred vision in left eye since yesterday  Other systems are as noted in HPI.  Constitutional and vital signs reviewed.      All other systems reviewed and negative except as noted above.    Physical Exam     ED Triage Vitals [24 1828]   /75   Pulse 78   Resp 16   Temp 98.3 °F (36.8 °C)   Temp src Temporal   SpO2 99 %   O2 Device None (Room air)       Current:/74   Pulse 67   Temp 98 °F (36.7 °C)   Resp 16   Ht 160 cm (5' 3\")   Wt 104.3 kg   LMP 10/09/2024   SpO2 99%   BMI 40.74 kg/m²     Right Eye Chart Acuity: 20/20, Uncorrected  Left Eye Chart Acuity: 20/20, Uncorrected    Physical  Exam    Constitutional: The patient is oriented to person, place, and time, appears well-developed and well-nourished.   HENT:   Head: Normocephalic.   Nose: Nose normal.   Mouth/Throat: Oropharynx is clear and moist.   Eyes: Conjunctivae and EOM are normal. Pupils are equal, round, and reactive to light.   Neck: Normal range of motion and full passive range of motion without pain. Neck supple.   Cardiovascular: Normal rate, regular rhythm, normal heart sounds and intact distal pulses.    Pulmonary/Chest: Effort normal and breath sounds normal, no tenderness.   Abdominal: Soft. Bowel sounds are normal. There is no tenderness.   Musculoskeletal: Normal range of motion.   Neurological: alert and oriented to person, place, and time, normal strength and normal reflexes, No cranial nerve deficit or sensory deficit.   Skin: Skin is warm and dry.          ED Course/ My interpretations:     Labs Reviewed   COMP METABOLIC PANEL (14) - Abnormal; Notable for the following components:       Result Value    AST 9 (*)     All other components within normal limits   POCT PREGNANCY URINE - Normal   CBC WITH DIFFERENTIAL WITH PLATELET         My independent imaging interpretation:  CT of the brain without contrast demonstrated no intracranial bleed.    All available radiology reports for this visit reviewed.      Medications given:  Orders Placed This Encounter    Comp Metabolic Panel (14)    CBC With Differential With Platelet    sodium chloride 0.9 % IV bolus 1,000 mL    acetaminophen (Tylenol Extra Strength) tab 1,000 mg    metoclopramide (Reglan) 5 mg/mL injection 10 mg    diphenhydrAMINE (Benadryl) 50 mg/mL  injection 25 mg    magnesium sulfate in dextrose 5% 1 g/100mL infusion premix 1 g    iopamidol 76% (ISOVUE-370) injection for power injector    SUMAtriptan 50 MG Oral Tab                  MDM      Extensive differential diagnosis was considered for the patient including migraine, tension headache, sinusitis, meningitis,  subarachnoid hemorrhage, brain mass and other pathology.     CT/CTA of the head and neck was pursued with no acute abnormality identified.  Patient's neurologic examination remained normal.  Symptoms improved.    They were treated symptomatically with significant improvement.  They their primary care physician.  They were advised on reasons to return to the emergency department if he were to worsen.  They demonstrated understanding, they are comfortable with the plan and will follow-up as directed.      Disposition and Plan     Clinical Impression:  1. Acute nonintractable headache, unspecified headache type    2. Blurry vision, left eye         Disposition:  Discharge  11/6/2024 10:42 pm    Follow-up:  Gabby Vail MD  [address]  [facility] [address]  [phone]    Schedule an appointment as soon as possible for a visit  For recheck    Martir Bradley MD  [address]  [facility] [address]  [facility] [address] [phone]    Schedule an appointment as soon as possible for a visit in 5 day(s)  For recheck    [address] [address]  Schedule an appointment as soon as possible for a visit  For recheck          Medications Prescribed:  Current Discharge Medication List        START taking these medications    Details   SUMAtriptan 50 MG Oral Tab Take 1 tablet (50 mg total) by mouth every 2 (two) hours as needed for Migraine. No more than 4 tablets/24 hours.  Qty: 6 tablet, Refills: 0                 Supplementary Documentation:                                                       Documentation created with the aid of Dragon voice recognition software.  Although efforts were made to ensure the accuracy of the note, some inaccuracies may persist.

## 2024-11-11 ENCOUNTER — TELEPHONE (OUTPATIENT)
Dept: FAMILY MEDICINE CLINIC | Facility: CLINIC | Age: 29
End: 2024-11-11

## 2024-11-11 NOTE — TELEPHONE ENCOUNTER
Prior authorization initiated for,await 1-5 days for decision    Amphetamine-Dextroamphet ER (ADDERALL XR) 25 MG Oral Capsule SR 24 hr

## 2024-11-11 NOTE — TELEPHONE ENCOUNTER
Amphetamine-Dextroamphet ER (ADDERALL XR) 25 MG Oral Capsule SR 24 Hr requires prior auth    Please complete and close chart notes and route back to triage support

## 2024-11-12 NOTE — TELEPHONE ENCOUNTER
Approved    Prior authorization approved  Payer: [employer] [facility] Case ID: [id number]    [phone]  Note from payer: The case has been Approved from  [id number] to [id number]  Approval Details    Authorized from August 13, 2024 to November 11, 2025  Electronic appeal: Not supported  View History   yes

## 2024-11-13 ENCOUNTER — PATIENT MESSAGE (OUTPATIENT)
Dept: FAMILY MEDICINE CLINIC | Facility: CLINIC | Age: 29
End: 2024-11-13

## 2024-12-04 DIAGNOSIS — F90.2 ATTENTION DEFICIT HYPERACTIVITY DISORDER (ADHD), COMBINED TYPE: ICD-10-CM

## 2024-12-04 NOTE — TELEPHONE ENCOUNTER
Naina Painter requesting Medication Refill for:    Amphetamine-Dextroamphet ER (ADDERALL XR) 25 MG   amphetamine-dextroamphetamine (ADDERALL) 10 MG       Preferred Pharmacy:   [facility] #[id number]        LOV: 11/8/2024

## 2024-12-04 NOTE — TELEPHONE ENCOUNTER
Left message to clarify pharmacy, because 3 months scripts were sent to [facility] for 11/8/2024,12/9/24, and 1/9/24

## 2024-12-05 RX ORDER — DEXTROAMPHETAMINE SACCHARATE, AMPHETAMINE ASPARTATE, DEXTROAMPHETAMINE SULFATE AND AMPHETAMINE SULFATE 2.5; 2.5; 2.5; 2.5 MG/1; MG/1; MG/1; MG/1
10 TABLET ORAL DAILY
Qty: 30 TABLET | Refills: 0 | Status: CANCELLED | OUTPATIENT
Start: 2024-12-09 | End: 2025-01-08

## 2024-12-05 NOTE — TELEPHONE ENCOUNTER
Triage call transferred.   Spoke with pt, confirmed prefers [facility]. [facility]. Upated preferred pharmacies.   Informed we did already send message to PCP- awaiting authorization. Pt verbalized understanding and agreed with POC.

## 2024-12-05 NOTE — TELEPHONE ENCOUNTER
Patient called back, no longer uses the [facility] pharmacy. Asking to send to [facility] #[id number]

## 2024-12-06 ENCOUNTER — TELEPHONE (OUTPATIENT)
Dept: FAMILY MEDICINE CLINIC | Facility: CLINIC | Age: 29
End: 2024-12-06

## 2024-12-06 RX ORDER — DEXTROAMPHETAMINE SACCHARATE, AMPHETAMINE ASPARTATE MONOHYDRATE, DEXTROAMPHETAMINE SULFATE AND AMPHETAMINE SULFATE 6.25; 6.25; 6.25; 6.25 MG/1; MG/1; MG/1; MG/1
25 CAPSULE, EXTENDED RELEASE ORAL DAILY
Qty: 30 CAPSULE | Refills: 0 | Status: SHIPPED | OUTPATIENT
Start: 2025-01-09 | End: 2025-02-08

## 2024-12-06 RX ORDER — DEXTROAMPHETAMINE SACCHARATE, AMPHETAMINE ASPARTATE MONOHYDRATE, DEXTROAMPHETAMINE SULFATE AND AMPHETAMINE SULFATE 6.25; 6.25; 6.25; 6.25 MG/1; MG/1; MG/1; MG/1
25 CAPSULE, EXTENDED RELEASE ORAL DAILY
Qty: 30 CAPSULE | Refills: 0 | Status: SHIPPED | OUTPATIENT
Start: 2024-12-06 | End: 2025-01-05

## 2024-12-06 RX ORDER — DEXTROAMPHETAMINE SACCHARATE, AMPHETAMINE ASPARTATE, DEXTROAMPHETAMINE SULFATE AND AMPHETAMINE SULFATE 2.5; 2.5; 2.5; 2.5 MG/1; MG/1; MG/1; MG/1
10 TABLET ORAL DAILY
Qty: 30 TABLET | Refills: 0 | Status: SHIPPED | OUTPATIENT
Start: 2025-01-06 | End: 2025-02-05

## 2024-12-06 RX ORDER — DEXTROAMPHETAMINE SACCHARATE, AMPHETAMINE ASPARTATE, DEXTROAMPHETAMINE SULFATE AND AMPHETAMINE SULFATE 2.5; 2.5; 2.5; 2.5 MG/1; MG/1; MG/1; MG/1
10 TABLET ORAL DAILY
Qty: 30 TABLET | Refills: 0 | Status: SHIPPED | OUTPATIENT
Start: 2025-02-06 | End: 2025-03-08

## 2024-12-06 RX ORDER — DEXTROAMPHETAMINE SACCHARATE, AMPHETAMINE ASPARTATE, DEXTROAMPHETAMINE SULFATE AND AMPHETAMINE SULFATE 2.5; 2.5; 2.5; 2.5 MG/1; MG/1; MG/1; MG/1
10 TABLET ORAL DAILY
Qty: 30 TABLET | Refills: 0 | Status: SHIPPED | OUTPATIENT
Start: 2024-12-06 | End: 2025-01-05

## 2024-12-06 RX ORDER — DEXTROAMPHETAMINE SACCHARATE, AMPHETAMINE ASPARTATE MONOHYDRATE, DEXTROAMPHETAMINE SULFATE AND AMPHETAMINE SULFATE 6.25; 6.25; 6.25; 6.25 MG/1; MG/1; MG/1; MG/1
25 CAPSULE, EXTENDED RELEASE ORAL DAILY
Qty: 30 CAPSULE | Refills: 0 | Status: SHIPPED | OUTPATIENT
Start: 2024-12-09 | End: 2025-01-09

## 2024-12-07 NOTE — TELEPHONE ENCOUNTER
Approved    Prior authorization approved  Payer: [employer] [facility] Case ID: [id number]    [phone]    [phone]  Note from payer: The case has been Approved from  [id number] to [id number]  Approval Details    Authorized from September 7, 2024 to December 6, 2025

## 2024-12-08 ENCOUNTER — PATIENT MESSAGE (OUTPATIENT)
Dept: FAMILY MEDICINE CLINIC | Facility: CLINIC | Age: 29
End: 2024-12-08

## 2025-02-03 DIAGNOSIS — F90.2 ATTENTION DEFICIT HYPERACTIVITY DISORDER (ADHD), COMBINED TYPE: ICD-10-CM

## 2025-02-03 RX ORDER — DEXTROAMPHETAMINE SACCHARATE, AMPHETAMINE ASPARTATE MONOHYDRATE, DEXTROAMPHETAMINE SULFATE AND AMPHETAMINE SULFATE 6.25; 6.25; 6.25; 6.25 MG/1; MG/1; MG/1; MG/1
25 CAPSULE, EXTENDED RELEASE ORAL DAILY
Qty: 30 CAPSULE | Refills: 0 | OUTPATIENT
Start: 2025-02-08 | End: 2025-03-10

## 2025-02-03 NOTE — TELEPHONE ENCOUNTER
Naina Painter requesting Medication Refill for:      Amphetamine-Dextroamphet ER (ADDERALL XR) 25 MG Oral Capsule SR 24 Hr     Medication name and dose (copy and paste from medication list):     If medication is not on medication list - transfer patient to RN queue for triage    Preferred Pharmacy: [facility] #[id number] [place], [place]     LOV: 11/8/2024   Last Refill date:       For adderall 25 mg last refill date 01/09/25  Next Scheduled appointment:

## 2025-03-04 ENCOUNTER — LAB ENCOUNTER (OUTPATIENT)
Dept: LAB | Age: 30
End: 2025-03-04
Attending: FAMILY MEDICINE
Payer: MEDICAID

## 2025-03-04 DIAGNOSIS — Z00.00 LABORATORY EXAM ORDERED AS PART OF ROUTINE GENERAL MEDICAL EXAMINATION: ICD-10-CM

## 2025-03-04 LAB
ALBUMIN SERPL-MCNC: 4.5 G/DL (ref 3.2–4.8)
ALBUMIN/GLOB SERPL: 1.7 {RATIO} (ref 1–2)
ALP LIVER SERPL-CCNC: 61 U/L
ALT SERPL-CCNC: 12 U/L
ANION GAP SERPL CALC-SCNC: 10 MMOL/L (ref 0–18)
AST SERPL-CCNC: 19 U/L (ref ?–34)
BASOPHILS # BLD AUTO: 0.05 X10(3) UL (ref 0–0.2)
BASOPHILS NFR BLD AUTO: 0.6 %
BILIRUB SERPL-MCNC: 0.2 MG/DL (ref 0.3–1.2)
BUN BLD-MCNC: 9 MG/DL (ref 9–23)
CALCIUM BLD-MCNC: 9 MG/DL (ref 8.7–10.6)
CHLORIDE SERPL-SCNC: 105 MMOL/L (ref 98–112)
CHOLEST SERPL-MCNC: 153 MG/DL (ref ?–200)
CO2 SERPL-SCNC: 27 MMOL/L (ref 21–32)
CREAT BLD-MCNC: 0.75 MG/DL
EGFRCR SERPLBLD CKD-EPI 2021: 111 ML/MIN/1.73M2 (ref 60–?)
EOSINOPHIL # BLD AUTO: 0.28 X10(3) UL (ref 0–0.7)
EOSINOPHIL NFR BLD AUTO: 3.6 %
ERYTHROCYTE [DISTWIDTH] IN BLOOD BY AUTOMATED COUNT: 12.7 %
EST. AVERAGE GLUCOSE BLD GHB EST-MCNC: 100 MG/DL (ref 68–126)
FASTING PATIENT LIPID ANSWER: YES
FASTING STATUS PATIENT QL REPORTED: YES
GLOBULIN PLAS-MCNC: 2.6 G/DL (ref 2–3.5)
GLUCOSE BLD-MCNC: 94 MG/DL (ref 70–99)
HBA1C MFR BLD: 5.1 % (ref ?–5.7)
HCT VFR BLD AUTO: 41 %
HDLC SERPL-MCNC: 37 MG/DL (ref 40–59)
HGB BLD-MCNC: 13.5 G/DL
IMM GRANULOCYTES # BLD AUTO: 0.03 X10(3) UL (ref 0–1)
IMM GRANULOCYTES NFR BLD: 0.4 %
LDLC SERPL CALC-MCNC: 92 MG/DL (ref ?–100)
LYMPHOCYTES # BLD AUTO: 2.71 X10(3) UL (ref 1–4)
LYMPHOCYTES NFR BLD AUTO: 35.1 %
MCH RBC QN AUTO: 28.4 PG (ref 26–34)
MCHC RBC AUTO-ENTMCNC: 32.9 G/DL (ref 31–37)
MCV RBC AUTO: 86.1 FL
MONOCYTES # BLD AUTO: 0.53 X10(3) UL (ref 0.1–1)
MONOCYTES NFR BLD AUTO: 6.9 %
NEUTROPHILS # BLD AUTO: 4.12 X10 (3) UL (ref 1.5–7.7)
NEUTROPHILS # BLD AUTO: 4.12 X10(3) UL (ref 1.5–7.7)
NEUTROPHILS NFR BLD AUTO: 53.4 %
NONHDLC SERPL-MCNC: 116 MG/DL (ref ?–130)
OSMOLALITY SERPL CALC.SUM OF ELEC: 292 MOSM/KG (ref 275–295)
PLATELET # BLD AUTO: 266 10(3)UL (ref 150–450)
POTASSIUM SERPL-SCNC: 4.5 MMOL/L (ref 3.5–5.1)
PROT SERPL-MCNC: 7.1 G/DL (ref 5.7–8.2)
RBC # BLD AUTO: 4.76 X10(6)UL
SODIUM SERPL-SCNC: 142 MMOL/L (ref 136–145)
TRIGL SERPL-MCNC: 135 MG/DL (ref 30–149)
TSI SER-ACNC: 3.65 UIU/ML (ref 0.55–4.78)
VLDLC SERPL CALC-MCNC: 22 MG/DL (ref 0–30)
WBC # BLD AUTO: 7.7 X10(3) UL (ref 4–11)

## 2025-03-04 PROCEDURE — 84443 ASSAY THYROID STIM HORMONE: CPT

## 2025-03-04 PROCEDURE — 83036 HEMOGLOBIN GLYCOSYLATED A1C: CPT

## 2025-03-04 PROCEDURE — 85025 COMPLETE CBC W/AUTO DIFF WBC: CPT

## 2025-03-04 PROCEDURE — 80061 LIPID PANEL: CPT

## 2025-03-04 PROCEDURE — 36415 COLL VENOUS BLD VENIPUNCTURE: CPT

## 2025-03-04 PROCEDURE — 80053 COMPREHEN METABOLIC PANEL: CPT

## 2025-03-11 ENCOUNTER — TELEPHONE (OUTPATIENT)
Dept: FAMILY MEDICINE CLINIC | Facility: CLINIC | Age: 30
End: 2025-03-11

## 2025-03-11 ENCOUNTER — PATIENT MESSAGE (OUTPATIENT)
Dept: FAMILY MEDICINE CLINIC | Facility: CLINIC | Age: 30
End: 2025-03-11

## 2025-03-11 ENCOUNTER — OFFICE VISIT (OUTPATIENT)
Dept: FAMILY MEDICINE CLINIC | Facility: CLINIC | Age: 30
End: 2025-03-11
Payer: MEDICAID

## 2025-03-11 VITALS
WEIGHT: 253 LBS | HEIGHT: 63 IN | RESPIRATION RATE: 18 BRPM | HEART RATE: 79 BPM | DIASTOLIC BLOOD PRESSURE: 70 MMHG | BODY MASS INDEX: 44.83 KG/M2 | OXYGEN SATURATION: 99 % | SYSTOLIC BLOOD PRESSURE: 120 MMHG

## 2025-03-11 DIAGNOSIS — E66.813 CLASS 3 SEVERE OBESITY WITHOUT SERIOUS COMORBIDITY WITH BODY MASS INDEX (BMI) OF 40.0 TO 44.9 IN ADULT, UNSPECIFIED OBESITY TYPE (HCC): Primary | ICD-10-CM

## 2025-03-11 DIAGNOSIS — F90.2 ATTENTION DEFICIT HYPERACTIVITY DISORDER (ADHD), COMBINED TYPE: ICD-10-CM

## 2025-03-11 DIAGNOSIS — E66.01 CLASS 3 SEVERE OBESITY WITHOUT SERIOUS COMORBIDITY WITH BODY MASS INDEX (BMI) OF 40.0 TO 44.9 IN ADULT, UNSPECIFIED OBESITY TYPE (HCC): Primary | ICD-10-CM

## 2025-03-11 DIAGNOSIS — Z59.10 INADEQUATE HOUSING, UNSPECIFIED: ICD-10-CM

## 2025-03-11 PROBLEM — Z34.90 PREGNANCY (HCC): Status: RESOLVED | Noted: 2020-07-07 | Resolved: 2025-03-11

## 2025-03-11 PROCEDURE — 99214 OFFICE O/P EST MOD 30 MIN: CPT | Performed by: FAMILY MEDICINE

## 2025-03-11 RX ORDER — DEXTROAMPHETAMINE SACCHARATE, AMPHETAMINE ASPARTATE, DEXTROAMPHETAMINE SULFATE AND AMPHETAMINE SULFATE 2.5; 2.5; 2.5; 2.5 MG/1; MG/1; MG/1; MG/1
10 TABLET ORAL
Qty: 30 TABLET | Refills: 0 | Status: SHIPPED | OUTPATIENT
Start: 2025-03-11

## 2025-03-11 RX ORDER — DEXTROAMPHETAMINE SACCHARATE, AMPHETAMINE ASPARTATE MONOHYDRATE, DEXTROAMPHETAMINE SULFATE AND AMPHETAMINE SULFATE 7.5; 7.5; 7.5; 7.5 MG/1; MG/1; MG/1; MG/1
30 CAPSULE, EXTENDED RELEASE ORAL DAILY
Qty: 30 CAPSULE | Refills: 0 | Status: SHIPPED | OUTPATIENT
Start: 2025-03-11 | End: 2025-04-10

## 2025-03-11 RX ORDER — TIRZEPATIDE 2.5 MG/.5ML
2.5 INJECTION, SOLUTION SUBCUTANEOUS
Qty: 2 ML | Refills: 0 | Status: SHIPPED | OUTPATIENT
Start: 2025-03-11

## 2025-03-11 RX ORDER — DEXTROAMPHETAMINE SACCHARATE, AMPHETAMINE ASPARTATE MONOHYDRATE, DEXTROAMPHETAMINE SULFATE AND AMPHETAMINE SULFATE 7.5; 7.5; 7.5; 7.5 MG/1; MG/1; MG/1; MG/1
30 CAPSULE, EXTENDED RELEASE ORAL DAILY
Qty: 30 CAPSULE | Refills: 0 | Status: SHIPPED | OUTPATIENT
Start: 2025-04-11 | End: 2025-05-11

## 2025-03-11 RX ORDER — DEXTROAMPHETAMINE SACCHARATE, AMPHETAMINE ASPARTATE MONOHYDRATE, DEXTROAMPHETAMINE SULFATE AND AMPHETAMINE SULFATE 7.5; 7.5; 7.5; 7.5 MG/1; MG/1; MG/1; MG/1
30 CAPSULE, EXTENDED RELEASE ORAL DAILY
Qty: 30 CAPSULE | Refills: 0 | Status: SHIPPED | OUTPATIENT
Start: 2025-05-12 | End: 2025-06-11

## 2025-03-11 SDOH — ECONOMIC STABILITY - HOUSING INSECURITY: INADEQUATE HOUSING UNSPECIFIED: Z59.10

## 2025-03-11 NOTE — TELEPHONE ENCOUNTER
Spoke with patient and she states during office visit she was told she does not have diabetes. She would like for prior authorization for Zepbound to be completed. Informed patient RN will call pharmacy to see if prior auth can be sent to provider.    Called patient's [facility], they stated the Zepbound is not covered and submitting a prior authorization is not an option.   Called patient back and informed her of this information.     Advised patient to call her insurance to see what medications are covered under her health insurance plan that are for weight loss. Patient verbalized understanding and will call back with information from insurance.

## 2025-03-11 NOTE — TELEPHONE ENCOUNTER
Triage call transferred.   Spoke with pt, saw PCP today (3/11) and prescribed Zepbound. Per pharmacy, will cover Ozempic or PCP prefers, may proceed with PA.  Informed will relay to PCP update and for recommendations.  No further questions or concerns. Pt verbalized understanding and agreed with POC.    Please advise. Thank you.

## 2025-03-11 NOTE — PATIENT INSTRUCTIONS
Behavioral Health resources that accept Medicaid:     [facility] (therapy and psychiatry)   [phone]    [facility] (therapy and psychiatry)   [phone]     [facility] (therapy and psychiatry)   [phone]     [facility]    [phone]    [facility]   [address]   [address]   [facility] [address]   Phone: [phone]   [email or web address]      [facility]   [address] [facility] [address]   Phone: [phone]   [email or web address]     [facility]   [address] [facility] [address]   Phone: [phone]

## 2025-03-12 ENCOUNTER — TELEPHONE (OUTPATIENT)
Dept: FAMILY MEDICINE CLINIC | Facility: CLINIC | Age: 30
End: 2025-03-12

## 2025-03-12 NOTE — PROGRESS NOTES
Subjective:   Naina Painter is a 29 year old female who presents for Medication Follow-Up (Adderall adjustment )         The following individual(s) verbally consented to be recorded using ambient AI listening technology and understand that they can each withdraw their consent to this listening technology at any point by asking the clinician to turn off or pause the recording:    Patient name: Naina Painter      History/Other:   History of Present Illness  The patient presents with difficulty losing weight despite lifestyle changes.    She is experiencing difficulty losing weight despite engaging in regular physical activity and calorie counting. Her diet includes eggs, cottage cheese, and occasionally Santiago bread, while she avoids eating out and does not consume food after 7 PM. She has also given up added sugars for Lent. Despite these efforts, she has not observed significant weight loss. She mentions a friend who lost weight using Ozempic but had to stop due to heart issues. She inquires about similar medications for weight loss, as her blood sugar levels are normal and she does not have diabetes.    She experiences persistent back pain, which she feels is unusual for her age, and occasional knee pain. She expresses frustration with her lack of weight loss progress and the impact of her weight on her physical health.    She is currently taking Adderall, with a regimen of 25 mg in the morning and 10 mg in the afternoon, though she does not use the afternoon dose daily. She feels the morning dose wears off quickly and is considering an increase in dosage.    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 She acknowledges being hard on herself and questions her self-worth, though she does not experience depression. She is open to exploring therapy for self-esteem issues.    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 She identifies sugar as a  significant challenge in her diet but has successfully reduced her intake. No issues with anxiety or depression.       Chief Complaint Reviewed and Verified  Nursing Notes Reviewed and   Verified  Tobacco Reviewed  Allergies Reviewed  Medications Reviewed    Problem List Reviewed  Medical History Reviewed  Surgical History   Reviewed  OB Status Reviewed  Family History Reviewed  Social History   Reviewed         Tobacco:  She has never smoked tobacco.    Current Outpatient Medications   Medication Sig Dispense Refill    amphetamine-dextroamphetamine ER (ADDERALL XR) 30 MG Oral Capsule SR 24 Hr Take 1 capsule (30 mg total) by mouth daily. 30 capsule 0    [START ON 4/11/2025] amphetamine-dextroamphetamine ER (ADDERALL XR) 30 MG Oral Capsule SR 24 Hr Take 1 capsule (30 mg total) by mouth daily. 30 capsule 0    [START ON 5/12/2025] amphetamine-dextroamphetamine ER (ADDERALL XR) 30 MG Oral Capsule SR 24 Hr Take 1 capsule (30 mg total) by mouth daily. 30 capsule 0    Tirzepatide-Weight Management (ZEPBOUND) 2.5 MG/0.5ML Subcutaneous Solution Auto-injector Inject 2.5 mg into the skin every 7 days. 2 mL 0    amphetamine-dextroamphetamine (ADDERALL) 10 MG Oral Tab Take 1 tablet (10 mg total) by mouth After lunch. 30 tablet 0    montelukast 10 MG Oral Tab Take 1 tablet (10 mg total) by mouth nightly. 90 tablet 3    albuterol 108 (90 Base) MCG/ACT Inhalation Aero Soln Inhale 2 puffs into the lungs every 6 (six) hours as needed. 3 each 1         Review of Systems:  Review of Systems   All other systems reviewed and are negative.        Objective:   /70   Pulse 79   Resp 18   Ht 5' 3\" (1.6 m)   Wt 253 lb (114.8 kg)   LMP 01/15/2025 (Approximate)   SpO2 99%   Breastfeeding Yes   BMI 44.82 kg/m²  Estimated body mass index is 44.82 kg/m² as calculated from the following:    Height as of this encounter: 5' 3\" (1.6 m).    Weight as of this encounter: 253 lb (114.8 kg).  Physical Exam  Constitutional:        General: She is not in acute distress.  Cardiovascular:      Rate and Rhythm: Normal rate and regular rhythm.   Pulmonary:      Effort: Pulmonary effort is normal.      Breath sounds: Normal breath sounds.   Skin:     Findings: No rash.   Neurological:      General: No focal deficit present.      Mental Status: She is alert and oriented to person, place, and time.   Psychiatric:         Mood and Affect: Mood normal.         Behavior: Behavior normal.       Results          Assessment & Plan:   1. Class 3 severe obesity without serious comorbidity with body mass index (BMI) of 40.0 to 44.9 in adult, unspecified obesity type (HCC) (Primary)  -     Zepbound; Inject 2.5 mg into the skin every 7 days.  Dispense: 2 mL; Refill: 0  2. Attention deficit hyperactivity disorder (ADHD), combined type  -     Amphetamine-Dextroamphet ER; Take 1 capsule (30 mg total) by mouth daily.  Dispense: 30 capsule; Refill: 0  -     Amphetamine-Dextroamphet ER; Take 1 capsule (30 mg total) by mouth daily.  Dispense: 30 capsule; Refill: 0  -     Amphetamine-Dextroamphet ER; Take 1 capsule (30 mg total) by mouth daily.  Dispense: 30 capsule; Refill: 0  -     Amphetamine-Dextroamphetamine; Take 1 tablet (10 mg total) by mouth After lunch.  Dispense: 30 tablet; Refill: 0    Assessment & Plan  Obesity  She is attempting weight loss through diet and exercise without significant results. Interested in pharmacological assistance. Blood work does not indicate diabetes. Discussed Zepbound side effects and dosing plan. Breastfeeding has not significantly aided weight loss. Insurance coverage will be checked.  - Prescribe Zepbound for weight loss and send prescription to pharmacy.  - Check insurance coverage for Zepbound.  - Continue current dietary changes and exercise regimen.    ADHD  Currently on Adderall 25 mg AM and 10 mg PM as needed. Morning dose wears off quickly. Discussed Concerta but prefers Adderall due to familiarity and insurance issues  with Vyvanse. Considered increasing morning dose.  - Increase morning dose of Adderall to 30 mg.  - Refill short-acting 10 mg Adderall for afternoon use as needed.    Low HDL Cholesterol  Blood work shows low HDL cholesterol due to dietary changes. Suggested increasing intake of good fats to improve HDL levels.  - Increase intake of good fats such as fish, avocado oil, almonds, walnuts, and pecans.  - Consider adding berries for antioxidants.    Self-esteem issues  Reports self-doubt in roles as student and mother. Counseling or self-help resources recommended. Discussed impact of self-esteem on weight loss and well-being.  - Provide recommendations for counselors.  - Suggest self-help books or audiobooks on self-esteem.  - Provide apps for self-esteem and parenting support.      Return in about 3 months (around 6/11/2025) for weight loss.      BRANDI DACOSTA MD, 3/11/2025, 11:16 PM

## 2025-03-12 NOTE — TELEPHONE ENCOUNTER
Prior auth completed for Zepbound.   Awaiting outcome.    Per 3/11/25 medication encounter, pharmacy states Zepbound not covered without option for prior auth.  Patient was notified to contact insurance.

## 2025-05-12 DIAGNOSIS — F90.2 ATTENTION DEFICIT HYPERACTIVITY DISORDER (ADHD), COMBINED TYPE: ICD-10-CM

## 2025-05-13 RX ORDER — DEXTROAMPHETAMINE SACCHARATE, AMPHETAMINE ASPARTATE, DEXTROAMPHETAMINE SULFATE AND AMPHETAMINE SULFATE 2.5; 2.5; 2.5; 2.5 MG/1; MG/1; MG/1; MG/1
10 TABLET ORAL
Qty: 30 TABLET | Refills: 0 | Status: SHIPPED | OUTPATIENT
Start: 2025-05-13

## 2025-05-13 RX ORDER — METFORMIN HYDROCHLORIDE 500 MG/1
500 TABLET, EXTENDED RELEASE ORAL
Qty: 90 TABLET | Refills: 1 | Status: SHIPPED | OUTPATIENT
Start: 2025-05-13

## 2025-05-13 RX ORDER — DEXTROAMPHETAMINE SACCHARATE, AMPHETAMINE ASPARTATE MONOHYDRATE, DEXTROAMPHETAMINE SULFATE AND AMPHETAMINE SULFATE 7.5; 7.5; 7.5; 7.5 MG/1; MG/1; MG/1; MG/1
30 CAPSULE, EXTENDED RELEASE ORAL DAILY
Qty: 30 CAPSULE | Refills: 0 | OUTPATIENT
Start: 2025-05-13 | End: 2025-06-12

## 2025-05-13 NOTE — TELEPHONE ENCOUNTER
Please review; protocol failed/No Protocol    Adderall 10mg  Recent Fills: 01/07/2025, 02/05/2025, 03/12/2025    Last Rx Written: 03/11/2025    Last Office Visit: 03/11/2025

## 2025-06-12 DIAGNOSIS — F90.2 ATTENTION DEFICIT HYPERACTIVITY DISORDER (ADHD), COMBINED TYPE: ICD-10-CM

## 2025-06-13 NOTE — TELEPHONE ENCOUNTER
05/13/2025  LAST WRITTEN: 05/13/2025  Quantity: 30    Recent Visits  Date Type Provider Dept   03/11/25 Office Visit Gabby Vail MD [address]

## 2025-06-14 RX ORDER — DEXTROAMPHETAMINE SACCHARATE, AMPHETAMINE ASPARTATE, DEXTROAMPHETAMINE SULFATE AND AMPHETAMINE SULFATE 2.5; 2.5; 2.5; 2.5 MG/1; MG/1; MG/1; MG/1
10 TABLET ORAL
Qty: 30 TABLET | Refills: 0 | Status: SHIPPED | OUTPATIENT
Start: 2025-06-14

## 2025-06-14 RX ORDER — DEXTROAMPHETAMINE SACCHARATE, AMPHETAMINE ASPARTATE MONOHYDRATE, DEXTROAMPHETAMINE SULFATE AND AMPHETAMINE SULFATE 7.5; 7.5; 7.5; 7.5 MG/1; MG/1; MG/1; MG/1
30 CAPSULE, EXTENDED RELEASE ORAL DAILY
Qty: 30 CAPSULE | Refills: 0 | Status: SHIPPED | OUTPATIENT
Start: 2025-06-14 | End: 2025-07-14

## 2025-07-17 ENCOUNTER — TELEPHONE (OUTPATIENT)
Dept: FAMILY MEDICINE CLINIC | Facility: CLINIC | Age: 30
End: 2025-07-17

## 2025-07-17 NOTE — TELEPHONE ENCOUNTER
Received call from pt stating pharmacy told her she needs prior authorization for adderall.     Routing to [facility] team for assistance.

## 2025-08-21 ENCOUNTER — TELEPHONE (OUTPATIENT)
Dept: FAMILY MEDICINE CLINIC | Facility: CLINIC | Age: 30
End: 2025-08-21

## 2025-08-21 DIAGNOSIS — E66.813 CLASS 3 SEVERE OBESITY WITHOUT SERIOUS COMORBIDITY WITH BODY MASS INDEX (BMI) OF 40.0 TO 44.9 IN ADULT, UNSPECIFIED OBESITY TYPE: ICD-10-CM

## 2025-08-21 DIAGNOSIS — E28.2 PCOS (POLYCYSTIC OVARIAN SYNDROME): ICD-10-CM

## 2025-08-21 RX ORDER — SEMAGLUTIDE 0.25 MG/.5ML
0.25 INJECTION, SOLUTION SUBCUTANEOUS WEEKLY
Qty: 2 ML | Refills: 0 | OUTPATIENT
Start: 2025-08-21

## (undated) NOTE — LETTER
02/13/19              [address] [facility], [address]                  It looks like the doctor did approve your medication refill however they did state you are due for a follow up visit.  You can schedule this appointment on [facility] or yo

## (undated) NOTE — MR AVS SNAPSHOT
[address] [place] [id number] [phone]               Thank you for choosing us for your health care visit with Jonelle Weller MD.  We are glad to serve you and happy to provide you with this summary of you Current Medications          This list is accurate as of: 5/19/17 12:27 PM.  Always use your most recent med list.                Albuterol Sulfate  (90 Base) MCG/ACT Aers   Inhale 2 puffs into the lungs every 6 (six) hours as needed.            Amph Eat plenty of low-fat dairy products High fat meats and dairy   Choose whole grain products Foods high in sodium   Water is best for hydration Fast food.    Eat at home when possible     Tips for increasing your physical activity – Adults who are physically

## (undated) NOTE — LETTER
Date: 1/19/2018    Patient Name: Huan Poon          To Whom it may concern: This letter has been written at the patient's request. The above patient was seen at the [facility] for treatment of a medical condition.     The patient may

## (undated) NOTE — LETTER
[address] [facility], [id number] [place] [address] [phone]            August 30, 2019      [facility] [place] [address]  Evelyn [place] [id number]      Dear Ms.  [address]

## (undated) NOTE — LETTER
12/10/18          Naina Painter  :  1995      To Whom It May Concern: This patient was seen in our office on 12/10/18 . Work status:   May return to work full-time, no restrictions    May return to work status per above effective

## (undated) NOTE — Clinical Note
ASTHMA ACTION PLAN for Treasure Sigala     : 1995     Date: 2017  Provider:  AGNES Donahue  Phone for doctor or clinic: [address] [place] [address]  [address]  [facility] [address] [phone]    ACT Score: 5

## (undated) NOTE — LETTER
ASTHMA ACTION PLAN for Sarai Milan     : 1995     Date: 2021  Provider:  Leonidas Killian MD  Phone for doctor or clinic: [address] [facility],  [address]  [facility] [id number] [phone]    ACT Score: 25

## (undated) NOTE — Clinical Note
ASTHMA ACTION PLAN for Winsome Tripp     : 1995     Date: 2017  Provider:  Rodolfo Logan MD  Phone for doctor or clinic: ED [facility], [address]  [address]  [facility] [address] [phone]    ACT Score: 5

## (undated) NOTE — LETTER
Date: 7/23/2018    Patient Name: Antonina Ward          To Whom it may concern: This letter has been written at the patient's request. The above patient was seen at the [facility] for treatment of a medical condition.     This patient sh

## (undated) NOTE — LETTER
12/10/18          Naina Painter  :  1995      To Whom It May Concern: This patient was seen in our office on 12/10/18 . Work status:   May return to work part-time status, no restrictions    May return to work status per above [employer]

## (undated) NOTE — Clinical Note
Date: 5/19/2017    Patient Name: Alondra Poole          To Whom it may concern: The above patient was seen at the [facility] for treatment of a medical condition.     This patient should be excused from attending work/school from 5/18/17

## (undated) NOTE — ED AVS SNAPSHOT
Alondra Poole   MRN: [id number]    Department:  [facility] [place]   Date of Visit:  9/23/2019           Disclosure     Insurance plans vary and the physician(s) referred by the ER may not be covered by your plan.  Please conta tell this physician (or your personal doctor if your instructions are to return to your personal doctor) about any new or lasting problems. The primary care or specialist physician will see patients referred from the [facility] Emergency Department.  Salvadore Skiff

## (undated) NOTE — LETTER
Date: 11/6/2024    Patient Name: Naina Painter            To Whom it may concern:    This letter has been written at the patient's request. The above patient was seen at [facility] for treatment of a medical condition.    This patient should be excused from attending work/school from 11/6/24 through 11/6/24.        Sincerely,    BRANDI DACOSTA MD

## (undated) NOTE — LETTER
Date & Time: 9/23/2019, 9:49 PM  Patient: Alec Evans  Encounter Provider(s): Maria Teresa Flores MD       To Whom It May Concern:    Russell Langford was seen and treated in our department on 9/23/2019. She should not return to school until 9/26/19.     I

## (undated) NOTE — LETTER
ASTHMA ACTION PLAN for Tiara Li     : 1995     Date: 8/10/2018  Provider:  Monica Gonzalez MD  Phone for doctor or clinic: ED [facility], [address], [place] [address] [address]  [facility] [address] [phone]    ACT Score: 25

## (undated) NOTE — LETTER
08/07/18        [address] [facility]      Dear Marcelina Mon records indicate that you have outstanding lab work and or testing that was ordered for you and has not yet been completed:          CBC W Differential W Platelet